# Patient Record
Sex: FEMALE | URBAN - METROPOLITAN AREA
[De-identification: names, ages, dates, MRNs, and addresses within clinical notes are randomized per-mention and may not be internally consistent; named-entity substitution may affect disease eponyms.]

---

## 2017-02-24 ENCOUNTER — EMERGENCY (EMERGENCY)
Facility: HOSPITAL | Age: 32
LOS: 1 days | Discharge: HOME | End: 2017-02-24
Attending: EMERGENCY MEDICINE | Admitting: EMERGENCY MEDICINE
Payer: SELF-PAY

## 2017-02-24 VITALS — HEIGHT: 57 IN | WEIGHT: 158.07 LBS

## 2017-02-24 VITALS
OXYGEN SATURATION: 99 % | HEART RATE: 72 BPM | TEMPERATURE: 99 F | DIASTOLIC BLOOD PRESSURE: 93 MMHG | RESPIRATION RATE: 20 BRPM | SYSTOLIC BLOOD PRESSURE: 152 MMHG

## 2017-02-24 DIAGNOSIS — G43.109 MIGRAINE WITH AURA, NOT INTRACTABLE, WITHOUT STATUS MIGRAINOSUS: ICD-10-CM

## 2017-02-24 DIAGNOSIS — R51 HEADACHE: ICD-10-CM

## 2017-02-24 LAB
ALBUMIN SERPL ELPH-MCNC: 4.3 G/DL — SIGNIFICANT CHANGE UP (ref 3.3–5.2)
ALP SERPL-CCNC: 76 U/L — SIGNIFICANT CHANGE UP (ref 40–120)
ALT FLD-CCNC: 27 U/L — SIGNIFICANT CHANGE UP
ANION GAP SERPL CALC-SCNC: 11 MMOL/L — SIGNIFICANT CHANGE UP (ref 5–17)
APPEARANCE UR: CLEAR — SIGNIFICANT CHANGE UP
AST SERPL-CCNC: 22 U/L — SIGNIFICANT CHANGE UP
BASOPHILS # BLD AUTO: 0 K/UL — SIGNIFICANT CHANGE UP (ref 0–0.2)
BASOPHILS NFR BLD AUTO: 0.4 % — SIGNIFICANT CHANGE UP (ref 0–2)
BILIRUB SERPL-MCNC: 0.2 MG/DL — LOW (ref 0.4–2)
BILIRUB UR-MCNC: NEGATIVE — SIGNIFICANT CHANGE UP
BUN SERPL-MCNC: 7 MG/DL — LOW (ref 8–20)
CALCIUM SERPL-MCNC: 9.5 MG/DL — SIGNIFICANT CHANGE UP (ref 8.6–10.2)
CHLORIDE SERPL-SCNC: 101 MMOL/L — SIGNIFICANT CHANGE UP (ref 98–107)
CO2 SERPL-SCNC: 27 MMOL/L — SIGNIFICANT CHANGE UP (ref 22–29)
COLOR SPEC: YELLOW — SIGNIFICANT CHANGE UP
CREAT SERPL-MCNC: 0.48 MG/DL — LOW (ref 0.5–1.3)
DIFF PNL FLD: ABNORMAL
EOSINOPHIL # BLD AUTO: 0.2 K/UL — SIGNIFICANT CHANGE UP (ref 0–0.5)
EOSINOPHIL NFR BLD AUTO: 1.8 % — SIGNIFICANT CHANGE UP (ref 0–6)
EPI CELLS # UR: SIGNIFICANT CHANGE UP
GLUCOSE SERPL-MCNC: 99 MG/DL — SIGNIFICANT CHANGE UP (ref 70–115)
GLUCOSE UR QL: NEGATIVE MG/DL — SIGNIFICANT CHANGE UP
HCG UR QL: NEGATIVE — SIGNIFICANT CHANGE UP
HCT VFR BLD CALC: 37.5 % — SIGNIFICANT CHANGE UP (ref 37–47)
HGB BLD-MCNC: 13.1 G/DL — SIGNIFICANT CHANGE UP (ref 12–16)
KETONES UR-MCNC: NEGATIVE — SIGNIFICANT CHANGE UP
LEUKOCYTE ESTERASE UR-ACNC: ABNORMAL
LYMPHOCYTES # BLD AUTO: 3.5 K/UL — SIGNIFICANT CHANGE UP (ref 1–4.8)
LYMPHOCYTES # BLD AUTO: 38.4 % — SIGNIFICANT CHANGE UP (ref 20–55)
MCHC RBC-ENTMCNC: 30.2 PG — SIGNIFICANT CHANGE UP (ref 27–31)
MCHC RBC-ENTMCNC: 34.9 G/DL — SIGNIFICANT CHANGE UP (ref 32–36)
MCV RBC AUTO: 86.4 FL — SIGNIFICANT CHANGE UP (ref 81–99)
MONOCYTES # BLD AUTO: 0.6 K/UL — SIGNIFICANT CHANGE UP (ref 0–0.8)
MONOCYTES NFR BLD AUTO: 6.5 % — SIGNIFICANT CHANGE UP (ref 3–10)
NEUTROPHILS # BLD AUTO: 4.8 K/UL — SIGNIFICANT CHANGE UP (ref 1.8–8)
NEUTROPHILS NFR BLD AUTO: 52.7 % — SIGNIFICANT CHANGE UP (ref 37–73)
NITRITE UR-MCNC: NEGATIVE — SIGNIFICANT CHANGE UP
PH UR: 7 — SIGNIFICANT CHANGE UP (ref 4.8–8)
PLATELET # BLD AUTO: 276 K/UL — SIGNIFICANT CHANGE UP (ref 150–400)
POTASSIUM SERPL-MCNC: 3.6 MMOL/L — SIGNIFICANT CHANGE UP (ref 3.5–5.3)
POTASSIUM SERPL-SCNC: 3.6 MMOL/L — SIGNIFICANT CHANGE UP (ref 3.5–5.3)
PROT SERPL-MCNC: 8 G/DL — SIGNIFICANT CHANGE UP (ref 6.6–8.7)
PROT UR-MCNC: NEGATIVE MG/DL — SIGNIFICANT CHANGE UP
RBC # BLD: 4.34 M/UL — LOW (ref 4.4–5.2)
RBC # FLD: 13 % — SIGNIFICANT CHANGE UP (ref 11–15.6)
RBC CASTS # UR COMP ASSIST: ABNORMAL /HPF (ref 0–4)
SODIUM SERPL-SCNC: 139 MMOL/L — SIGNIFICANT CHANGE UP (ref 135–145)
SP GR SPEC: 1.01 — SIGNIFICANT CHANGE UP (ref 1.01–1.02)
UROBILINOGEN FLD QL: 1 MG/DL
WBC # BLD: 9.1 K/UL — SIGNIFICANT CHANGE UP (ref 4.8–10.8)
WBC # FLD AUTO: 9.1 K/UL — SIGNIFICANT CHANGE UP (ref 4.8–10.8)
WBC UR QL: ABNORMAL

## 2017-02-24 PROCEDURE — 85027 COMPLETE CBC AUTOMATED: CPT

## 2017-02-24 PROCEDURE — T1013: CPT

## 2017-02-24 PROCEDURE — 81025 URINE PREGNANCY TEST: CPT

## 2017-02-24 PROCEDURE — 70450 CT HEAD/BRAIN W/O DYE: CPT | Mod: 26

## 2017-02-24 PROCEDURE — 99284 EMERGENCY DEPT VISIT MOD MDM: CPT | Mod: 25

## 2017-02-24 PROCEDURE — 70450 CT HEAD/BRAIN W/O DYE: CPT

## 2017-02-24 PROCEDURE — 99283 EMERGENCY DEPT VISIT LOW MDM: CPT

## 2017-02-24 PROCEDURE — 80053 COMPREHEN METABOLIC PANEL: CPT

## 2017-02-24 PROCEDURE — 96374 THER/PROPH/DIAG INJ IV PUSH: CPT

## 2017-02-24 PROCEDURE — 81001 URINALYSIS AUTO W/SCOPE: CPT

## 2017-02-24 RX ORDER — DIAZEPAM 5 MG
5 TABLET ORAL ONCE
Qty: 0 | Refills: 0 | Status: DISCONTINUED | OUTPATIENT
Start: 2017-02-24 | End: 2017-02-24

## 2017-02-24 RX ORDER — ONDANSETRON 8 MG/1
4 TABLET, FILM COATED ORAL ONCE
Qty: 0 | Refills: 0 | Status: COMPLETED | OUTPATIENT
Start: 2017-02-24 | End: 2017-02-24

## 2017-02-24 RX ADMIN — Medication 5 MILLIGRAM(S): at 21:16

## 2017-02-24 RX ADMIN — ONDANSETRON 4 MILLIGRAM(S): 8 TABLET, FILM COATED ORAL at 21:16

## 2017-02-24 NOTE — ED ADULT TRIAGE NOTE - CHIEF COMPLAINT QUOTE
c/o constant headache 3/10, started a wk ago states her vision gets worst went to [place] [facility] today and they said to come to ED maybe

## 2017-02-24 NOTE — ED ADULT NURSE NOTE - CHIEF COMPLAINT QUOTE
c/o constant headache 3/10, started a wk ago states her vision gets worst went to [place] [facility] today and they said to come to ED maybe

## 2017-02-24 NOTE — ED STATDOCS - EYES, MLM
Visual acuity: Right eye 20/200, left eye 20/200. Limited fundoscopic examination, retinal vessels normal appearing, limited visualization due to pt not being dilated. No hematoma, optic disc looks normal.

## 2017-02-24 NOTE — ED STATDOCS - OBJECTIVE STATEMENT
32 y/o female presents to ED c/o frontal HA x 2 weeks, more constant over the past week. Associated nausea. Denies vomiting. Pt reports +light sensitivity and sound sensitivity, and reports no relief with Advil. She also notes blurred vision x 6 days, worsened today. Denies eye pain or complete visual loss. Pt was seen at [facility] clinic earlier today, who stated that her symptoms were likely due to a migraine, though they advised her to seek ED evaluation for her vision changes. No medical evaluation prior to today. Pt does not wear glasses at baseline. Pt reports past h/o similar visual changes during pregnancy in 2014, though pt did not follow up as instructed. No further complaints at this time.  Aniyah Sandoval utilized to obtain history.

## 2017-02-24 NOTE — ED STATDOCS - ATTENDING CONTRIBUTION TO CARE
I, Eleazar Arguelles, performed the initial face to face bedside interview with this patient regarding history of present illness, review of symptoms and relevant past medical, social and family history.  I completed an independent physical examination.  I was the initial provider who evaluated this patient. I have signed out the follow up of any pending tests (i.e. labs, radiological studies) to the ACP.  I have communicated the patient’s plan of care and disposition with the ACP.  The history, relevant review of systems, past medical and surgical history, medical decision making, and physical examination was documented by the scribe in my presence and I attest to the accuracy of the documentation.

## 2017-02-24 NOTE — ED STATDOCS - NS ED MD SCRIBE ATTENDING SCRIBE SECTIONS
VITAL SIGNS( Pullset)/PAST MEDICAL/SURGICAL/SOCIAL HISTORY/HISTORY OF PRESENT ILLNESS/PHYSICAL EXAM/HIV/REVIEW OF SYSTEMS/DISPOSITION

## 2017-12-20 ENCOUNTER — EMERGENCY (EMERGENCY)
Facility: HOSPITAL | Age: 32
LOS: 1 days | Discharge: HOME | End: 2017-12-20
Attending: EMERGENCY MEDICINE
Payer: MEDICAID

## 2017-12-20 VITALS
HEART RATE: 78 BPM | TEMPERATURE: 98 F | SYSTOLIC BLOOD PRESSURE: 131 MMHG | DIASTOLIC BLOOD PRESSURE: 82 MMHG | RESPIRATION RATE: 18 BRPM | OXYGEN SATURATION: 97 % | WEIGHT: 156.09 LBS

## 2017-12-20 LAB
ALBUMIN SERPL ELPH-MCNC: 4.2 G/DL — SIGNIFICANT CHANGE UP (ref 3.3–5.2)
ALP SERPL-CCNC: 52 U/L — SIGNIFICANT CHANGE UP (ref 40–120)
ALT FLD-CCNC: 21 U/L — SIGNIFICANT CHANGE UP
ANION GAP SERPL CALC-SCNC: 14 MMOL/L — SIGNIFICANT CHANGE UP (ref 5–17)
AST SERPL-CCNC: 22 U/L — SIGNIFICANT CHANGE UP
BASOPHILS # BLD AUTO: 0 K/UL — SIGNIFICANT CHANGE UP (ref 0–0.2)
BASOPHILS NFR BLD AUTO: 0.6 % — SIGNIFICANT CHANGE UP (ref 0–2)
BILIRUB SERPL-MCNC: 0.2 MG/DL — LOW (ref 0.4–2)
BUN SERPL-MCNC: 7 MG/DL — LOW (ref 8–20)
CALCIUM SERPL-MCNC: 9.7 MG/DL — SIGNIFICANT CHANGE UP (ref 8.6–10.2)
CHLORIDE SERPL-SCNC: 100 MMOL/L — SIGNIFICANT CHANGE UP (ref 98–107)
CO2 SERPL-SCNC: 24 MMOL/L — SIGNIFICANT CHANGE UP (ref 22–29)
CREAT SERPL-MCNC: 0.48 MG/DL — LOW (ref 0.5–1.3)
EOSINOPHIL # BLD AUTO: 0.3 K/UL — SIGNIFICANT CHANGE UP (ref 0–0.5)
EOSINOPHIL NFR BLD AUTO: 3.6 % — SIGNIFICANT CHANGE UP (ref 0–6)
GLUCOSE SERPL-MCNC: 99 MG/DL — SIGNIFICANT CHANGE UP (ref 70–115)
HCT VFR BLD CALC: 38.8 % — SIGNIFICANT CHANGE UP (ref 37–47)
HGB BLD-MCNC: 13.3 G/DL — SIGNIFICANT CHANGE UP (ref 12–16)
LYMPHOCYTES # BLD AUTO: 3.1 K/UL — SIGNIFICANT CHANGE UP (ref 1–4.8)
LYMPHOCYTES # BLD AUTO: 37.2 % — SIGNIFICANT CHANGE UP (ref 20–55)
MCHC RBC-ENTMCNC: 30 PG — SIGNIFICANT CHANGE UP (ref 27–31)
MCHC RBC-ENTMCNC: 34.3 G/DL — SIGNIFICANT CHANGE UP (ref 32–36)
MCV RBC AUTO: 87.4 FL — SIGNIFICANT CHANGE UP (ref 81–99)
MONOCYTES # BLD AUTO: 0.7 K/UL — SIGNIFICANT CHANGE UP (ref 0–0.8)
MONOCYTES NFR BLD AUTO: 8.4 % — SIGNIFICANT CHANGE UP (ref 3–10)
NEUTROPHILS # BLD AUTO: 4.2 K/UL — SIGNIFICANT CHANGE UP (ref 1.8–8)
NEUTROPHILS NFR BLD AUTO: 50 % — SIGNIFICANT CHANGE UP (ref 37–73)
PLATELET # BLD AUTO: 292 K/UL — SIGNIFICANT CHANGE UP (ref 150–400)
POTASSIUM SERPL-MCNC: 4 MMOL/L — SIGNIFICANT CHANGE UP (ref 3.5–5.3)
POTASSIUM SERPL-SCNC: 4 MMOL/L — SIGNIFICANT CHANGE UP (ref 3.5–5.3)
PROT SERPL-MCNC: 8.1 G/DL — SIGNIFICANT CHANGE UP (ref 6.6–8.7)
RBC # BLD: 4.44 M/UL — SIGNIFICANT CHANGE UP (ref 4.4–5.2)
RBC # FLD: 13.1 % — SIGNIFICANT CHANGE UP (ref 11–15.6)
SODIUM SERPL-SCNC: 138 MMOL/L — SIGNIFICANT CHANGE UP (ref 135–145)
TROPONIN T SERPL-MCNC: <0.01 NG/ML — SIGNIFICANT CHANGE UP (ref 0–0.06)
WBC # BLD: 8.3 K/UL — SIGNIFICANT CHANGE UP (ref 4.8–10.8)
WBC # FLD AUTO: 8.3 K/UL — SIGNIFICANT CHANGE UP (ref 4.8–10.8)

## 2017-12-20 PROCEDURE — 84484 ASSAY OF TROPONIN QUANT: CPT

## 2017-12-20 PROCEDURE — 36415 COLL VENOUS BLD VENIPUNCTURE: CPT

## 2017-12-20 PROCEDURE — 71045 X-RAY EXAM CHEST 1 VIEW: CPT

## 2017-12-20 PROCEDURE — 99284 EMERGENCY DEPT VISIT MOD MDM: CPT | Mod: 25

## 2017-12-20 PROCEDURE — T1013: CPT

## 2017-12-20 PROCEDURE — 93005 ELECTROCARDIOGRAM TRACING: CPT

## 2017-12-20 PROCEDURE — 85027 COMPLETE CBC AUTOMATED: CPT

## 2017-12-20 PROCEDURE — 80053 COMPREHEN METABOLIC PANEL: CPT

## 2017-12-20 PROCEDURE — 99284 EMERGENCY DEPT VISIT MOD MDM: CPT

## 2017-12-20 PROCEDURE — 96374 THER/PROPH/DIAG INJ IV PUSH: CPT

## 2017-12-20 PROCEDURE — 84443 ASSAY THYROID STIM HORMONE: CPT

## 2017-12-20 PROCEDURE — 71010: CPT | Mod: 26

## 2017-12-20 PROCEDURE — 93010 ELECTROCARDIOGRAM REPORT: CPT

## 2017-12-20 RX ORDER — ONDANSETRON 8 MG/1
1 TABLET, FILM COATED ORAL
Qty: 15 | Refills: 0 | OUTPATIENT
Start: 2017-12-20 | End: 2017-12-24

## 2017-12-20 RX ORDER — ONDANSETRON 8 MG/1
4 TABLET, FILM COATED ORAL ONCE
Qty: 0 | Refills: 0 | Status: COMPLETED | OUTPATIENT
Start: 2017-12-20 | End: 2017-12-20

## 2017-12-20 RX ORDER — MECLIZINE HCL 12.5 MG
1 TABLET ORAL
Qty: 7 | Refills: 0 | OUTPATIENT
Start: 2017-12-20 | End: 2017-12-26

## 2017-12-20 RX ORDER — ACETAMINOPHEN 500 MG
650 TABLET ORAL ONCE
Qty: 0 | Refills: 0 | Status: COMPLETED | OUTPATIENT
Start: 2017-12-20 | End: 2017-12-20

## 2017-12-20 RX ORDER — SODIUM CHLORIDE 9 MG/ML
1000 INJECTION INTRAMUSCULAR; INTRAVENOUS; SUBCUTANEOUS ONCE
Qty: 0 | Refills: 0 | Status: COMPLETED | OUTPATIENT
Start: 2017-12-20 | End: 2017-12-20

## 2017-12-20 RX ADMIN — Medication 650 MILLIGRAM(S): at 15:09

## 2017-12-20 RX ADMIN — ONDANSETRON 4 MILLIGRAM(S): 8 TABLET, FILM COATED ORAL at 15:10

## 2017-12-20 RX ADMIN — SODIUM CHLORIDE 2000 MILLILITER(S): 9 INJECTION INTRAMUSCULAR; INTRAVENOUS; SUBCUTANEOUS at 15:09

## 2017-12-20 NOTE — ED ADULT NURSE NOTE - OBJECTIVE STATEMENT
with  dizziness, headache about one month. body weakness. worse today and yesterday.  gets worse when I eat. "clinic told me it could be vertigo"

## 2017-12-20 NOTE — ED STATDOCS - OBJECTIVE STATEMENT
33 y/o F pt with a hx of HTN presents to ED c/o constant HA with dizziness/ weakness, nausea, dark clouds in her vision and blurry vison x 1 month that worsens when she eats and movement. She rates her pain a 6/10. Pt states she had a CT done here for a HA and was given a medication ( does not recall the name). She states that when she came here they found nothing and her HA's might be due to stress. She states she recently went to the clinic and was told she might have vertigo. Pt takes Advil with no relief. The pt adds that she is also having difficulty breathing with CP when she takes a deep breathe.  Denies cough, light sensitivity, V/D, fever, chills, SOB, and abdominal pain. No further complaints at this time.   : Chandni

## 2017-12-20 NOTE — ED STATDOCS - ATTENDING CONTRIBUTION TO CARE
I, Dr. Arguelles, performed the initial face to face bedside interview with this patient regarding history of present illness, review of symptoms and relevant past medical, social and family history.  I completed an independent physical examination.  I was the initial provider who evaluated this patient. I have signed out the follow up of any pending tests (i.e. labs, radiological studies) to the ACP.  I have communicated the patient’s plan of care and disposition with the ACP.

## 2017-12-20 NOTE — ED ADULT TRIAGE NOTE - CHIEF COMPLAINT QUOTE
pt c/o dizziness, HA, body aches, difficulty breathing, nausea. symptoms for 2 weeks, worse past 2 days.

## 2017-12-20 NOTE — ED STATDOCS - PROGRESS NOTE DETAILS
PA NOTE: Pt seen by intake physician and hpi/orders/plan reviewed.   PLAN: will follow up plan per intake physician. D/c with antivert, zofran, and neuro f/u.

## 2018-06-22 ENCOUNTER — EMERGENCY (EMERGENCY)
Facility: HOSPITAL | Age: 33
LOS: 1 days | Discharge: HOME | End: 2018-06-22
Attending: EMERGENCY MEDICINE
Payer: SELF-PAY

## 2018-06-22 VITALS
OXYGEN SATURATION: 96 % | HEART RATE: 98 BPM | WEIGHT: 160.06 LBS | TEMPERATURE: 98 F | HEIGHT: 62 IN | RESPIRATION RATE: 116 BRPM | SYSTOLIC BLOOD PRESSURE: 114 MMHG | DIASTOLIC BLOOD PRESSURE: 79 MMHG

## 2018-06-22 PROCEDURE — 99053 MED SERV 10PM-8AM 24 HR FAC: CPT

## 2018-06-22 PROCEDURE — 99284 EMERGENCY DEPT VISIT MOD MDM: CPT | Mod: 25
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
## 2018-06-23 VITALS
SYSTOLIC BLOOD PRESSURE: 137 MMHG | RESPIRATION RATE: 16 BRPM | OXYGEN SATURATION: 97 % | DIASTOLIC BLOOD PRESSURE: 91 MMHG | TEMPERATURE: 99 F | HEART RATE: 92 BPM

## 2018-06-23 PROCEDURE — 70450 CT HEAD/BRAIN W/O DYE: CPT

## 2018-06-23 PROCEDURE — 72125 CT NECK SPINE W/O DYE: CPT | Mod: 26

## 2018-06-23 PROCEDURE — T1013: CPT

## 2018-06-23 PROCEDURE — 99285 EMERGENCY DEPT VISIT HI MDM: CPT

## 2018-06-23 PROCEDURE — 72125 CT NECK SPINE W/O DYE: CPT

## 2018-06-23 PROCEDURE — 70450 CT HEAD/BRAIN W/O DYE: CPT | Mod: 26

## 2018-06-23 RX ORDER — BACITRACIN ZINC 500 UNIT/G
1 OINTMENT IN PACKET (EA) TOPICAL ONCE
Qty: 0 | Refills: 0 | Status: COMPLETED | OUTPATIENT
Start: 2018-06-23 | End: 2018-06-23

## 2018-06-23 RX ADMIN — Medication 1 APPLICATION(S): at 01:11

## 2018-06-23 NOTE — ED PROVIDER NOTE - CARE PLAN
Principal Discharge DX:	Acute alcohol intoxication Principal Discharge DX:	Acute alcohol intoxication  Secondary Diagnosis:	Abrasion  Secondary Diagnosis:	Closed head injury

## 2018-06-23 NOTE — ED PROVIDER NOTE - PHYSICAL EXAMINATION
sitting comfortably, talking in full sentences  pupilsa reactive, eomi,   mm moist, no oral injury, no facial pain  supple, no c/t/l spine tenderness, from, trachea in midline  Celso chest expansion, no cheat wall tenderness, no rib tenderness, no deformity, no clavicular pain  S1 S2 distant  abd soft, non tender, no g/r,   no pelvic pain  moves all ext, no swelling noted  Neuro aao3, cn intact grossly, no focal deficits  skin: abrasion on rt forehead no hematoma
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
## 2018-10-09 ENCOUNTER — EMERGENCY (EMERGENCY)
Facility: HOSPITAL | Age: 33
LOS: 1 days | Discharge: HOME | End: 2018-10-09
Attending: EMERGENCY MEDICINE
Payer: SELF-PAY

## 2018-10-09 VITALS
OXYGEN SATURATION: 98 % | TEMPERATURE: 99 F | WEIGHT: 179.9 LBS | SYSTOLIC BLOOD PRESSURE: 123 MMHG | DIASTOLIC BLOOD PRESSURE: 80 MMHG | RESPIRATION RATE: 16 BRPM | HEART RATE: 82 BPM | HEIGHT: 63 IN

## 2018-10-09 PROBLEM — G43.909 MIGRAINE, UNSPECIFIED, NOT INTRACTABLE, WITHOUT STATUS MIGRAINOSUS: Chronic | Status: ACTIVE | Noted: 2017-12-20

## 2018-10-09 PROCEDURE — 99283 EMERGENCY DEPT VISIT LOW MDM: CPT

## 2018-10-09 PROCEDURE — T1013: CPT

## 2018-10-09 PROCEDURE — 99283 EMERGENCY DEPT VISIT LOW MDM: CPT | Mod: 25

## 2018-10-09 PROCEDURE — 99053 MED SERV 10PM-8AM 24 HR FAC: CPT
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
## 2018-10-09 NOTE — ED PROVIDER NOTE - MEDICAL DECISION MAKING DETAILS
clinvally sobe rno signs trauma return to ed for intractable HA, persistent vomiting, or new onset motor/sensory deficits
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
## 2018-10-18 ENCOUNTER — EMERGENCY (EMERGENCY)
Facility: HOSPITAL | Age: 33
LOS: 1 days | Discharge: HOME | End: 2018-10-18
Attending: EMERGENCY MEDICINE
Payer: MEDICAID

## 2018-10-18 VITALS
DIASTOLIC BLOOD PRESSURE: 81 MMHG | TEMPERATURE: 98 F | HEART RATE: 70 BPM | OXYGEN SATURATION: 99 % | SYSTOLIC BLOOD PRESSURE: 122 MMHG | RESPIRATION RATE: 18 BRPM

## 2018-10-18 VITALS — HEIGHT: 60 IN | WEIGHT: 166.01 LBS

## 2018-10-18 PROCEDURE — 93005 ELECTROCARDIOGRAM TRACING: CPT

## 2018-10-18 PROCEDURE — 71046 X-RAY EXAM CHEST 2 VIEWS: CPT

## 2018-10-18 PROCEDURE — 99283 EMERGENCY DEPT VISIT LOW MDM: CPT

## 2018-10-18 PROCEDURE — 71046 X-RAY EXAM CHEST 2 VIEWS: CPT | Mod: 26

## 2018-10-18 PROCEDURE — T1013: CPT

## 2018-10-18 PROCEDURE — 93010 ELECTROCARDIOGRAM REPORT: CPT

## 2018-10-18 RX ORDER — AZITHROMYCIN 500 MG/1
1 TABLET, FILM COATED ORAL
Qty: 1 | Refills: 0 | OUTPATIENT
Start: 2018-10-18 | End: 2018-10-22

## 2018-10-18 RX ORDER — IBUPROFEN 200 MG
600 TABLET ORAL ONCE
Qty: 0 | Refills: 0 | Status: COMPLETED | OUTPATIENT
Start: 2018-10-18 | End: 2018-10-18

## 2018-10-18 RX ADMIN — Medication 600 MILLIGRAM(S): at 11:31

## 2018-10-18 NOTE — ED STATDOCS - ATTENDING CONTRIBUTION TO CARE
I, Long Alexander, performed the initial face to face bedside interview with this patient regarding history of present illness, review of symptoms and relevant past medical, social and family history.  I completed an independent physical examination.  I was the initial provider who evaluated this patient. I have signed out the follow up of any pending tests (i.e. labs, radiological studies) to the ACP.  I have communicated the patient’s plan of care and disposition with the ACP.  The history, relevant review of systems, past medical and surgical history, medical decision making, and physical examination was documented by the scribe in my presence and I attest to the accuracy of the documentation.

## 2018-10-18 NOTE — ED ADULT NURSE NOTE - NSIMPLEMENTINTERV_GEN_ALL_ED
Implemented All Universal Safety Interventions:  [place] to call system. Call bell, personal items and telephone within reach. Instruct patient to call for assistance. Room bathroom lighting operational. Non-slip footwear when patient is off stretcher. Physically safe environment: no spills, clutter or unnecessary equipment. Stretcher in lowest position, wheels locked, appropriate side rails in place.
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
## 2018-10-18 NOTE — ED ADULT NURSE NOTE - OBJECTIVE STATEMENT
33 yof presents to ed with 2 young children all c/o cough denies fever denies chills denies burning urination lungs clear equal bilaterally abd soft nontender nondistended moves all extremities nonproductive cough afebrile at this time. 99% RA. no other complaints
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
## 2019-08-22 ENCOUNTER — EMERGENCY (EMERGENCY)
Facility: HOSPITAL | Age: 34
LOS: 1 days | Discharge: HOME | End: 2019-08-22
Attending: EMERGENCY MEDICINE
Payer: MEDICAID

## 2019-08-22 VITALS
HEIGHT: 62 IN | WEIGHT: 184.97 LBS | HEART RATE: 71 BPM | RESPIRATION RATE: 20 BRPM | SYSTOLIC BLOOD PRESSURE: 126 MMHG | TEMPERATURE: 99 F | DIASTOLIC BLOOD PRESSURE: 80 MMHG | OXYGEN SATURATION: 98 %

## 2019-08-22 PROCEDURE — 99282 EMERGENCY DEPT VISIT SF MDM: CPT

## 2019-08-22 PROCEDURE — 99283 EMERGENCY DEPT VISIT LOW MDM: CPT

## 2019-08-22 RX ORDER — DIPHENHYDRAMINE HCL 50 MG
25 CAPSULE ORAL EVERY 4 HOURS
Refills: 0 | Status: DISCONTINUED | OUTPATIENT
Start: 2019-08-22 | End: 2019-08-29

## 2019-08-22 RX ADMIN — Medication 25 MILLIGRAM(S): at 22:34

## 2019-08-22 NOTE — ED PROVIDER NOTE - CLINICAL SUMMARY MEDICAL DECISION MAKING FREE TEXT BOX
33 with various bug bites. no secondary signs of infection.   dc with instructions for aveeno calamine lotion and use on benadryl

## 2019-08-22 NOTE — ED PROVIDER NOTE - OBJECTIVE STATEMENT
32 yo female c/o of total body itching. states that they were outside 8 days ago. continued itching, minimal relief with otc medications. no fever chills. no topical creams used. son with bug bites to lower legs. no one else in the house with rash.

## 2019-08-22 NOTE — ED ADULT NURSE NOTE - OBJECTIVE STATEMENT
pt aox4 states c/o itchiness to upper extremities "I thought they were mosquito bites." denies any other complaints at this time. no apparent distress noted. pt educated on plan of care, pt able to successfully teach back plan of care to RN, RN will continue to reeducate pt during hospital stay.

## 2019-08-22 NOTE — ED PROVIDER NOTE - ATTENDING CONTRIBUTION TO CARE
I, Juwan Fuentes, performed a face to face bedside interview with this patient regarding history of present illness, review of symptoms and relevant past medical, social and family history.  I completed an independent physical examination. I have communicated the patient’s plan of care and disposition with the ACP.      32 yo female c/o of total body itching. states that they were outside 8 days ago. continued itching, minimal relief with otc medications. no fever chills. no topical creams used. son with bug bites to lower legs. no one else in the house with rash.  pe awake alert in nad; heent ncat neck supple  chest clear abd soft skin  papules with excoriations;   dx rash

## 2019-08-22 NOTE — ED ADULT TRIAGE NOTE - DIRECT TO ROOM CARE INITIATED:
CHENG RADER    Patient Age: 49 year old   Refill request by: Pharmacy fax  Refill to be: ePrescribed to [facility]    Medication requested to be refilled:     lisinopril-hydroCHLOROthiazide (PRINZIDE,ZESTORETIC) 20-12.5 MG per tablet  Sig: Take 1 tablet by mouth daily.    TRADJENTA  Sig: Take 1 tablet by mouth daily.     Next and Last Visit with Provider and Department  Last visit with this provider: 3/27/2019  Last visit with this department: Visit date not found    Next visit with this provider: Visit date not found  Next visit with this department: Visit date not found    WEIGHT AND HEIGHT:   Wt Readings from Last 1 Encounters:   03/27/19 68 kg (150 lb)     Ht Readings from Last 1 Encounters:   11/20/18 5' 5\" (1.651 m)     BMI Readings from Last 1 Encounters:   03/27/19 24.96 kg/m²       ALLERGIES:  Penicillins  Current Outpatient Medications   Medication   • atorvastatin (LIPITOR) 10 MG tablet   • linaGLIPtin (TRADJENTA) 5 MG tablet   • lisinopril-hydroCHLOROthiazide (PRINZIDE,ZESTORETIC) 20-12.5 MG per tablet   • metformin (GLUCOPHAGE-XR) 750 MG 24 hr tablet   • hydroCORTisone (CORTIZONE) 2.5 % ointment     No current facility-administered medications for this visit.      PHARMACY to use: [facility]           Pharmacy preference(s) on file:   [id number] - [place], [place] - [address]  [address] [address]  Phone: [phone] Fax: [phone]    [id number] - [place], [place] - [address]  [address]  [facility] [address]  Phone: [phone] Fax: [phone]      CALL BACK INFO: Ok to leave response (including medical information) with family member or on answering machine  ROUTING: Patient's physician/staff        PCP: No Pcp         INS: Payor: BLUE [employer]OSS BLUE [place]ELD [place] / Plan: PPO [id number] / Product Type: PPO MISC   PATIENT ADDRESS:  [address] [place] [address]     22-Aug-2019 21:54

## 2020-01-20 ENCOUNTER — EMERGENCY (EMERGENCY)
Facility: HOSPITAL | Age: 35
LOS: 1 days | Discharge: HOME | End: 2020-01-20
Attending: EMERGENCY MEDICINE
Payer: MEDICAID

## 2020-01-20 VITALS
SYSTOLIC BLOOD PRESSURE: 110 MMHG | RESPIRATION RATE: 16 BRPM | OXYGEN SATURATION: 98 % | HEART RATE: 105 BPM | DIASTOLIC BLOOD PRESSURE: 61 MMHG

## 2020-01-20 PROCEDURE — 99284 EMERGENCY DEPT VISIT MOD MDM: CPT

## 2020-01-20 PROCEDURE — 96375 TX/PRO/DX INJ NEW DRUG ADDON: CPT

## 2020-01-20 PROCEDURE — 99285 EMERGENCY DEPT VISIT HI MDM: CPT | Mod: 25

## 2020-01-20 PROCEDURE — T1013: CPT

## 2020-01-20 PROCEDURE — 96374 THER/PROPH/DIAG INJ IV PUSH: CPT

## 2020-01-20 RX ORDER — HALOPERIDOL DECANOATE 100 MG/ML
5 INJECTION INTRAMUSCULAR ONCE
Refills: 0 | Status: COMPLETED | OUTPATIENT
Start: 2020-01-20 | End: 2020-01-20

## 2020-01-20 RX ORDER — KETAMINE HYDROCHLORIDE 100 MG/ML
50 INJECTION INTRAMUSCULAR; INTRAVENOUS ONCE
Refills: 0 | Status: DISCONTINUED | OUTPATIENT
Start: 2020-01-20 | End: 2020-01-20

## 2020-01-20 RX ADMIN — KETAMINE HYDROCHLORIDE 50 MILLIGRAM(S): 100 INJECTION INTRAMUSCULAR; INTRAVENOUS at 20:50

## 2020-01-20 RX ADMIN — Medication 4 MILLIGRAM(S): at 20:50

## 2020-01-20 RX ADMIN — HALOPERIDOL DECANOATE 5 MILLIGRAM(S): 100 INJECTION INTRAMUSCULAR at 20:50

## 2020-01-20 NOTE — ED PROVIDER NOTE - NSFOLLOWUPINSTRUCTIONS_ED_ALL_ED_FT
Alcohol Abuse    Alcohol intoxication occurs when the amount of alcohol that a person has consumed impairs his or her ability to mentally and physically function. Chronic alcohol consumption can also lead to a variety of health issues including neurological disease, stomach disease, heart disease, liver disease, etc. Do not drive after drinking alcohol. Drinking enough alcohol to end up in an Emergency Room suggests you may have an alcohol abuse problem. Seek help at a drug addiction center.    SEEK IMMEDIATE MEDICAL CARE IF YOU HAVE ANY OF THE FOLLOWING SYMPTOMS: seizures, vomiting blood, blood in your stool, lightheadedness/dizziness, or becoming shaky to tremulous when you stop drinking. abuse
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
## 2020-01-20 NOTE — ED PROVIDER NOTE - UNABLE TO OBTAIN
unable to obtain HPI due to intoxication Urgent need for Intervention unable to obtain ROS due to intoxication
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
## 2020-01-20 NOTE — ED PROVIDER NOTE - PATIENT PORTAL LINK FT
You can access the FollowMyHealth Patient Portal offered by [facility] by registering at the following website: [email or web address]. By joining [facility]’s FollowMyHealth portal, you will also be able to view your health information using other applications (apps) compatible with our system.

## 2020-01-21 VITALS
HEART RATE: 85 BPM | DIASTOLIC BLOOD PRESSURE: 62 MMHG | OXYGEN SATURATION: 95 % | SYSTOLIC BLOOD PRESSURE: 102 MMHG | RESPIRATION RATE: 18 BRPM

## 2020-03-24 ENCOUNTER — EMERGENCY (EMERGENCY)
Facility: HOSPITAL | Age: 35
LOS: 1 days | Discharge: HOME | End: 2020-03-24
Attending: EMERGENCY MEDICINE
Payer: MEDICAID

## 2020-03-24 VITALS
SYSTOLIC BLOOD PRESSURE: 110 MMHG | OXYGEN SATURATION: 96 % | TEMPERATURE: 99 F | HEART RATE: 96 BPM | HEIGHT: 60 IN | WEIGHT: 184.97 LBS | DIASTOLIC BLOOD PRESSURE: 76 MMHG | RESPIRATION RATE: 18 BRPM

## 2020-03-24 LAB — RAPID RVP RESULT: SIGNIFICANT CHANGE UP

## 2020-03-24 PROCEDURE — 87581 M.PNEUMON DNA AMP PROBE: CPT

## 2020-03-24 PROCEDURE — 99284 EMERGENCY DEPT VISIT MOD MDM: CPT

## 2020-03-24 PROCEDURE — 87633 RESP VIRUS 12-25 TARGETS: CPT

## 2020-03-24 PROCEDURE — 87798 DETECT AGENT NOS DNA AMP: CPT

## 2020-03-24 PROCEDURE — T1013: CPT

## 2020-03-24 PROCEDURE — 87486 CHLMYD PNEUM DNA AMP PROBE: CPT

## 2020-03-24 PROCEDURE — U0001: CPT

## 2020-03-24 RX ORDER — ALBUTEROL 90 UG/1
2 AEROSOL, METERED ORAL
Qty: 1 | Refills: 0
Start: 2020-03-24 | End: 2020-04-22

## 2020-03-24 RX ORDER — ACETAMINOPHEN 500 MG
2 TABLET ORAL
Qty: 84 | Refills: 0
Start: 2020-03-24 | End: 2020-03-30

## 2020-03-24 NOTE — ED STATDOCS - PATIENT PORTAL LINK FT
You can access the FollowMyHealth Patient Portal offered by [facility] by registering at the following website: [email or web address]. By joining [facility]’s FollowMyHealth portal, you will also be able to view your health information using other applications (apps) compatible with our system.

## 2020-03-24 NOTE — ED ADULT TRIAGE NOTE - AS HEIGHT TYPE
Mailed letter.Christin Quinn MA/VISHNU    
Prescription approved per [facility] Refill Protocol #30  APPT NEEDED FOR FURTHER REFILLS  Please help the pt schedule an appointment hypertension visit, physical.    Health Maintenance Due   Topic Date Due     HIV SCREENING  11/15/1990     DTAP/TDAP/TD IMMUNIZATION (2 - Td) 05/12/2018     PREVENTIVE CARE VISIT  05/07/2019     LUIS ANTONIO ASSESSMENT  06/27/2019     PHQ-9  06/27/2019     Sarah Braswell RN     
stated

## 2020-03-24 NOTE — ED ADULT TRIAGE NOTE - WEIGHT IN KG
83.9 I will SWITCH the dose or number of times a day I take the medications listed below when I get home from the hospital:  None

## 2020-03-24 NOTE — ED STATDOCS - NSFOLLOWUPINSTRUCTIONS_ED_ALL_ED_FT
BARBARA TODAS LAS INSTRUCCIONES ATAQUES PARA CORONAVIRUS INMEDIATAMENTE -Se le ha probado para DETECTAR COVID19 (Coronavirus) minor shane visita al Departamento de Emergencias. -Los resultados tomarán 5-7 días -NO regrese al trabajo /escuela/áreas públicas hasta que los resultados de shane prueba COVID luh negativos. -SELF QUARANTINE hasta que el resultado DE COVID esté disponible. -Evitar el contacto con otras personas. -Lávese las roslyn con frecuencia. Desinfectar superficies con frecuencia BUSCA CUIDADO MEDICO INMEDIATO SI TIENE CUALQUIERA DE LOS SIGUIENTES SISTEMAS **Si desarrolla empeoramiento o nuevos síntomas caty dificultad para respirar, dificultad para respirar, dolor en el pecho, confusión, debilidad grave o cualquier cosa relacionada con usted, busque atención médica inmediata o regrese a Urgencias .**

## 2020-03-24 NOTE — ED STATDOCS - CLINICAL SUMMARY MEDICAL DECISION MAKING FREE TEXT BOX
Patient cough congestion, fever. Will send COVID testing, recommend Tylenol every 4 hours and cough suppressant

## 2020-03-24 NOTE — ED STATDOCS - NS ED ROS FT
Review of Systems  •	CONSTITUTIONAL -  (+) fever, (+) Body aches, no diaphoresis, no weight change  •	SKIN - no rash  •	HEMATOLOGIC - no bleeding, no bruising  •	EYES - no eye pain, no blurred vision  •	ENT - (+) Nasal congestion, no change in hearing, no pain  •	RESPIRATORY - (+) cough, no shortness of breath,  •	CARDIAC - no chest pain, no palpitations  •	GI - no abd pain, no nausea, no vomiting, no diarrhea, no constipation, no bleeding  •	GENITO-URINARY - no discharge, no dysuria; no hematuria,   •	ENDO - no polydypsia, no polyurea, no heat/no cold intolerance  •	MUSCULOSKELETAL - no joint pain, no swelling, no redness  •	NEUROLOGIC - no weakness, no headache, no anesthesia, no paresthesias  •	PSYCH - no anxiety, non suicidal, non homicidal, no hallucination, no depression

## 2020-03-24 NOTE — ED STATDOCS - OBJECTIVE STATEMENT
35 y/o female with PMHx of Migraines presents to ED c/o flu-like symptoms. Patient sates she has cough, congestion, subjective fever, diffuse body aches for 3 days. Took Ibuprofen at 11am with no relief.     Denies recent sick contact  : Yasmeen

## 2020-03-24 NOTE — ED STATDOCS - PHYSICAL EXAMINATION
VITAL SIGNS: I have reviewed nursing notes and confirm.  CONSTITUTIONAL: (+) Comfortable looking, Well-developed; well-nourished; in no acute distress.  SKIN: Skin exam is warm and dry, no acute rash.  HEAD: Normocephalic; atraumatic.  EYES: PERRL, EOM intact; conjunctiva and sclera clear.  ENT: No nasal discharge; airway clear. Throat clear.  NECK: Supple; non tender.    CARD: S1, S2 normal; no murmurs, gallops, or rubs. Regular rate and rhythm.  RESP: (+) Dry cough. No wheezes,  no rales or rhonchi.   ABD:  soft; non-distended; non-tender;   EXT: Normal ROM. No clubbing, cyanosis or edema.  NEURO: Alert, oriented. Grossly unremarkable. No focal deficits. no facial droop, moves all extremities,  no pronator drift, finger-to-nose wnl, normal gait   PSYCH: Cooperative, appropriate. VITAL SIGNS: I have reviewed nursing notes and confirm.  CONSTITUTIONAL: (+) Comfortable looking, Well-developed; well-nourished; in no acute distress.  SKIN: Skin exam is warm and dry, no acute rash.  HEAD: Normocephalic; atraumatic.  EYES: PERRL, EOM intact; conjunctiva and sclera clear.  ENT: No nasal discharge; airway clear. Throat clear.  NECK: Supple; non tender.    CARD: S1, S2 normal; no murmurs, gallops, or rubs. Regular rate and rhythm.  RESP: (+) Dry cough. No wheezes,  no rales or rhonchi.   ABD:  soft; non-distended; non-tender;   EXT: Normal ROM. No clubbing, cyanosis or edema.  NEURO: Alert, oriented. Grossly unremarkable. No focal deficits. no facial droop, moves all extremities, normal gait   PSYCH: Cooperative, appropriate.

## 2020-03-26 LAB — SARS-COV-2 RNA SPEC QL NAA+PROBE: DETECTED

## 2020-08-01 ENCOUNTER — OUTPATIENT (OUTPATIENT)
Dept: OUTPATIENT SERVICES | Facility: HOSPITAL | Age: 35
LOS: 1 days | End: 2020-08-01
Payer: MEDICAID

## 2020-08-01 PROCEDURE — G9005: CPT

## 2020-08-15 ENCOUNTER — EMERGENCY (EMERGENCY)
Facility: HOSPITAL | Age: 35
LOS: 1 days | Discharge: HOME | End: 2020-08-15
Attending: EMERGENCY MEDICINE
Payer: MEDICAID

## 2020-08-15 VITALS
HEART RATE: 80 BPM | WEIGHT: 182.98 LBS | HEIGHT: 60 IN | TEMPERATURE: 98 F | OXYGEN SATURATION: 98 % | SYSTOLIC BLOOD PRESSURE: 108 MMHG | DIASTOLIC BLOOD PRESSURE: 72 MMHG | RESPIRATION RATE: 16 BRPM

## 2020-08-15 LAB
ALBUMIN SERPL ELPH-MCNC: 4.1 G/DL — SIGNIFICANT CHANGE UP (ref 3.3–5.2)
ALP SERPL-CCNC: 74 U/L — SIGNIFICANT CHANGE UP (ref 40–120)
ALT FLD-CCNC: 22 U/L — SIGNIFICANT CHANGE UP
ANION GAP SERPL CALC-SCNC: 17 MMOL/L — SIGNIFICANT CHANGE UP (ref 5–17)
APPEARANCE UR: CLEAR — SIGNIFICANT CHANGE UP
AST SERPL-CCNC: 28 U/L — SIGNIFICANT CHANGE UP
BACTERIA # UR AUTO: ABNORMAL
BASOPHILS # BLD AUTO: 0.04 K/UL — SIGNIFICANT CHANGE UP (ref 0–0.2)
BASOPHILS NFR BLD AUTO: 0.4 % — SIGNIFICANT CHANGE UP (ref 0–2)
BILIRUB SERPL-MCNC: <0.2 MG/DL — LOW (ref 0.4–2)
BILIRUB UR-MCNC: NEGATIVE — SIGNIFICANT CHANGE UP
BLD GP AB SCN SERPL QL: SIGNIFICANT CHANGE UP
BUN SERPL-MCNC: 5 MG/DL — LOW (ref 8–20)
CALCIUM SERPL-MCNC: 9.3 MG/DL — SIGNIFICANT CHANGE UP (ref 8.6–10.2)
CHLORIDE SERPL-SCNC: 99 MMOL/L — SIGNIFICANT CHANGE UP (ref 98–107)
CO2 SERPL-SCNC: 21 MMOL/L — LOW (ref 22–29)
COLOR SPEC: YELLOW — SIGNIFICANT CHANGE UP
CREAT SERPL-MCNC: 0.4 MG/DL — LOW (ref 0.5–1.3)
DIFF PNL FLD: NEGATIVE — SIGNIFICANT CHANGE UP
EOSINOPHIL # BLD AUTO: 0.16 K/UL — SIGNIFICANT CHANGE UP (ref 0–0.5)
EOSINOPHIL NFR BLD AUTO: 1.7 % — SIGNIFICANT CHANGE UP (ref 0–6)
EPI CELLS # UR: SIGNIFICANT CHANGE UP
GLUCOSE SERPL-MCNC: 102 MG/DL — HIGH (ref 70–99)
GLUCOSE UR QL: NEGATIVE MG/DL — SIGNIFICANT CHANGE UP
HCT VFR BLD CALC: 36.4 % — SIGNIFICANT CHANGE UP (ref 34.5–45)
HGB BLD-MCNC: 12.5 G/DL — SIGNIFICANT CHANGE UP (ref 11.5–15.5)
IMM GRANULOCYTES NFR BLD AUTO: 0.3 % — SIGNIFICANT CHANGE UP (ref 0–1.5)
KETONES UR-MCNC: NEGATIVE — SIGNIFICANT CHANGE UP
LEUKOCYTE ESTERASE UR-ACNC: ABNORMAL
LYMPHOCYTES # BLD AUTO: 2.62 K/UL — SIGNIFICANT CHANGE UP (ref 1–3.3)
LYMPHOCYTES # BLD AUTO: 27.8 % — SIGNIFICANT CHANGE UP (ref 13–44)
MCHC RBC-ENTMCNC: 30.9 PG — SIGNIFICANT CHANGE UP (ref 27–34)
MCHC RBC-ENTMCNC: 34.3 GM/DL — SIGNIFICANT CHANGE UP (ref 32–36)
MCV RBC AUTO: 90.1 FL — SIGNIFICANT CHANGE UP (ref 80–100)
MONOCYTES # BLD AUTO: 0.58 K/UL — SIGNIFICANT CHANGE UP (ref 0–0.9)
MONOCYTES NFR BLD AUTO: 6.2 % — SIGNIFICANT CHANGE UP (ref 2–14)
NEUTROPHILS # BLD AUTO: 5.99 K/UL — SIGNIFICANT CHANGE UP (ref 1.8–7.4)
NEUTROPHILS NFR BLD AUTO: 63.6 % — SIGNIFICANT CHANGE UP (ref 43–77)
NITRITE UR-MCNC: NEGATIVE — SIGNIFICANT CHANGE UP
PH UR: 7 — SIGNIFICANT CHANGE UP (ref 5–8)
PLATELET # BLD AUTO: 304 K/UL — SIGNIFICANT CHANGE UP (ref 150–400)
POTASSIUM SERPL-MCNC: 3.6 MMOL/L — SIGNIFICANT CHANGE UP (ref 3.5–5.3)
POTASSIUM SERPL-SCNC: 3.6 MMOL/L — SIGNIFICANT CHANGE UP (ref 3.5–5.3)
PROT SERPL-MCNC: 7.7 G/DL — SIGNIFICANT CHANGE UP (ref 6.6–8.7)
PROT UR-MCNC: 15 MG/DL
RBC # BLD: 4.04 M/UL — SIGNIFICANT CHANGE UP (ref 3.8–5.2)
RBC # FLD: 12.2 % — SIGNIFICANT CHANGE UP (ref 10.3–14.5)
RBC CASTS # UR COMP ASSIST: ABNORMAL /HPF (ref 0–4)
SODIUM SERPL-SCNC: 137 MMOL/L — SIGNIFICANT CHANGE UP (ref 135–145)
SP GR SPEC: 1.01 — SIGNIFICANT CHANGE UP (ref 1.01–1.02)
UROBILINOGEN FLD QL: NEGATIVE MG/DL — SIGNIFICANT CHANGE UP
WBC # BLD: 9.42 K/UL — SIGNIFICANT CHANGE UP (ref 3.8–10.5)
WBC # FLD AUTO: 9.42 K/UL — SIGNIFICANT CHANGE UP (ref 3.8–10.5)
WBC UR QL: SIGNIFICANT CHANGE UP

## 2020-08-15 PROCEDURE — 96360 HYDRATION IV INFUSION INIT: CPT

## 2020-08-15 PROCEDURE — 76801 OB US < 14 WKS SINGLE FETUS: CPT | Mod: 26

## 2020-08-15 PROCEDURE — 84702 CHORIONIC GONADOTROPIN TEST: CPT

## 2020-08-15 PROCEDURE — 85027 COMPLETE CBC AUTOMATED: CPT

## 2020-08-15 PROCEDURE — 87086 URINE CULTURE/COLONY COUNT: CPT

## 2020-08-15 PROCEDURE — 80053 COMPREHEN METABOLIC PANEL: CPT

## 2020-08-15 PROCEDURE — 81001 URINALYSIS AUTO W/SCOPE: CPT

## 2020-08-15 PROCEDURE — 76801 OB US < 14 WKS SINGLE FETUS: CPT

## 2020-08-15 PROCEDURE — 36415 COLL VENOUS BLD VENIPUNCTURE: CPT

## 2020-08-15 PROCEDURE — 99284 EMERGENCY DEPT VISIT MOD MDM: CPT | Mod: 25

## 2020-08-15 PROCEDURE — T1013: CPT

## 2020-08-15 PROCEDURE — 86850 RBC ANTIBODY SCREEN: CPT

## 2020-08-15 PROCEDURE — 76815 OB US LIMITED FETUS(S): CPT | Mod: 26

## 2020-08-15 PROCEDURE — 86901 BLOOD TYPING SEROLOGIC RH(D): CPT

## 2020-08-15 PROCEDURE — 86900 BLOOD TYPING SEROLOGIC ABO: CPT

## 2020-08-15 PROCEDURE — 96361 HYDRATE IV INFUSION ADD-ON: CPT

## 2020-08-15 PROCEDURE — 99285 EMERGENCY DEPT VISIT HI MDM: CPT

## 2020-08-15 RX ORDER — CEPHALEXIN 500 MG
1 CAPSULE ORAL
Qty: 14 | Refills: 0
Start: 2020-08-15 | End: 2020-08-21

## 2020-08-15 RX ORDER — CEPHALEXIN 500 MG
500 CAPSULE ORAL ONCE
Refills: 0 | Status: COMPLETED | OUTPATIENT
Start: 2020-08-15 | End: 2020-08-15

## 2020-08-15 RX ORDER — SODIUM CHLORIDE 9 MG/ML
1000 INJECTION INTRAMUSCULAR; INTRAVENOUS; SUBCUTANEOUS ONCE
Refills: 0 | Status: COMPLETED | OUTPATIENT
Start: 2020-08-15 | End: 2020-08-15

## 2020-08-15 RX ORDER — ACETAMINOPHEN 500 MG
650 TABLET ORAL ONCE
Refills: 0 | Status: COMPLETED | OUTPATIENT
Start: 2020-08-15 | End: 2020-08-15

## 2020-08-15 RX ADMIN — SODIUM CHLORIDE 1000 MILLILITER(S): 9 INJECTION INTRAMUSCULAR; INTRAVENOUS; SUBCUTANEOUS at 17:51

## 2020-08-15 RX ADMIN — Medication 650 MILLIGRAM(S): at 17:51

## 2020-08-15 RX ADMIN — Medication 500 MILLIGRAM(S): at 20:55

## 2020-08-15 RX ADMIN — SODIUM CHLORIDE 1000 MILLILITER(S): 9 INJECTION INTRAMUSCULAR; INTRAVENOUS; SUBCUTANEOUS at 20:51

## 2020-08-15 NOTE — ED ADULT NURSE REASSESSMENT NOTE - NS ED NURSE REASSESS COMMENT FT1
Assumed care from previous RN. Plan of care reviewed. Chief complaint of abdominal pain. Pt states she is three months pregnant. Denies vomiting, nausea. Pt states she is has pressure when urinating. Denies vaginal bleeding. Pt alert and oriented x3, respirations even and unlabored, skin warm and dry, color appropriate for ethnicity, speech clear, moving extremities. Updated pt on plan of care. Will continue to monitor.

## 2020-08-15 NOTE — ED STATDOCS - CLINICAL SUMMARY MEDICAL DECISION MAKING FREE TEXT BOX
Patient approx. 3 months pregnant, presents with suprapubic tenderness and constant burning sensation. Will get blood work, urine, Tylenol for pain, IV fluids for hydration, and OB ultrasound.

## 2020-08-15 NOTE — ED STATDOCS - NSFOLLOWUPINSTRUCTIONS_ED_ALL_ED_FT
Urinary Tract Infection    A urinary tract infection (UTI) is an infection of any part of the urinary tract, which includes the kidneys, ureters, bladder, and urethra. Risk factors include ignoring your need to urinate, wiping back to front if female, being an uncircumcised male, and having diabetes or a weak immune system. Symptoms include frequent urination, pain or burning with urination, foul smelling urine, cloudy urine, pain in the lower abdomen, blood in the urine, and fever.  you were prescribed an antibiotic medicine, take it as told by your health care provider. Do not stop taking the antibiotic even if you start to feel better.    SEEK IMMEDIATE MEDICAL CARE IF YOU HAVE ANY OF THE FOLLOWING SYMPTOMS: severe back or abdominal pain, fever, inability to keep fluids or medicine down, dizziness/lightheadedness, or a change in mental status.   Follow up with [facility] clinic for pregnancy care. Urinary Tract Infection    A urinary tract infection (UTI) is an infection of any part of the urinary tract, which includes the kidneys, ureters, bladder, and urethra. Risk factors include ignoring your need to urinate, wiping back to front if female, being an uncircumcised male, and having diabetes or a weak immune system. Symptoms include frequent urination, pain or burning with urination, foul smelling urine, cloudy urine, pain in the lower abdomen, blood in the urine, and fever.  you were prescribed an antibiotic medicine, take it as told by your health care provider. Do not stop taking the antibiotic even if you start to feel better.    SEEK IMMEDIATE MEDICAL CARE IF YOU HAVE ANY OF THE FOLLOWING SYMPTOMS: severe back or abdominal pain, fever, inability to keep fluids or medicine down, dizziness/lightheadedness, or a change in mental status.      Follow up with [facility] clinic for pregnancy care.

## 2020-08-15 NOTE — ED ADULT TRIAGE NOTE - CHIEF COMPLAINT QUOTE
Pt A&Ox4 states "I have lower belly pain and I am 3 months pregnant." Patient having nausea and cramping, no spotting or bleeding. Started a week ago but worse today.

## 2020-08-15 NOTE — ED STATDOCS - OBJECTIVE STATEMENT
33 y/o female with PMHx of Migraines presents to ED c/o abdominal pain. Patient is about 3 months pregnant, [id number] reports abdominal pain, with a burning sensation, also reports vaginal pain and burning with urination. Has not had an ultrasound. Reports symptoms for the past 2 weeks, but worsened today.     Denies vaginal bleeding or discharge, N/V/D, fevers, chills, cough  OBGYN: [facility]

## 2020-08-15 NOTE — ED STATDOCS - PHYSICAL EXAMINATION
VITAL SIGNS: I have reviewed nursing notes and confirm.  CONSTITUTIONAL: Well-developed; well-nourished; in no acute distress.  SKIN: Skin exam is warm and dry, no acute rash.  HEAD: Normocephalic; atraumatic.  EYES: PERRL, EOM intact; conjunctiva and sclera clear.  ENT: No nasal discharge; airway clear. Throat clear.  NECK: Supple; non tender.    CARD: S1, S2 normal; Regular rate and rhythm.  RESP: No wheezes,  no rales or rhonchi.   ABD:  (+)Suprapubic tenderness, soft; non-distended;  EXT: Normal ROM. No clubbing, cyanosis or edema.  NEURO: Alert, oriented. Grossly unremarkable. No focal deficits. no facial droop, moves all extremities,  normal gait   PSYCH: Cooperative, appropriate.

## 2020-08-15 NOTE — ED STATDOCS - ATTENDING CONTRIBUTION TO CARE
I, Ulises Kim, performed the initial face to face bedside interview with this patient regarding history of present illness, review of symptoms and relevant past medical, social and family history.  I completed an independent physical examination.  I was the initial provider who evaluated this patient. I have signed out the follow up of any pending tests (i.e. labs, radiological studies) to the ACP.  I have communicated the patient’s plan of care and disposition with the ACP.

## 2020-08-15 NOTE — ED ADULT NURSE NOTE - OBJECTIVE STATEMENT
Pt  is currently 3 months pregnant c/o lower abdominal cramping x's 1 week. Denies any vaginal bleeding or discharge.

## 2020-08-15 NOTE — ED STATDOCS - PATIENT PORTAL LINK FT
You can access the FollowMyHealth Patient Portal offered by [facility] by registering at the following website: [email or web address]. By joining [employer]’s FollowMyHealth portal, you will also be able to view your health information using other applications (apps) compatible with our system.

## 2020-08-15 NOTE — ED STATDOCS - NS ED ROS FT
Constitutional: No fever.  Eyes: No vision changes.    Ears, Nose, Mouth, Throat: No sore throat.  Cardiovascular: No chest pain.  Respiratory: No difficulty breathing.  Gastrointestinal: (+) Burning sensation in abdomen, (+) abdominal pain, No nausea or vomiting.  Genitourinary: (+) Burning with urination, No dysuria.  Musculoskeletal: No joint pain.  Integumentary (skin and/or breast): No rash.  Neurological: No headache.  Psychiatric: No depression.  Endocrine:   No heat / cold intolerance.  Hematologic/Lymphatic: No easy bruising    Allergic/Immunologic:   No current allergic reactions.

## 2020-08-15 NOTE — ED ADULT NURSE NOTE - NSIMPLEMENTINTERV_GEN_ALL_ED
Implemented All Universal Safety Interventions:  [place] to call system. Call bell, personal items and telephone within reach. Instruct patient to call for assistance. Room bathroom lighting operational. Non-slip footwear when patient is off stretcher. Physically safe environment: no spills, clutter or unnecessary equipment. Stretcher in lowest position, wheels locked, appropriate side rails in place.

## 2020-08-16 LAB
CULTURE RESULTS: SIGNIFICANT CHANGE UP
SPECIMEN SOURCE: SIGNIFICANT CHANGE UP

## 2020-08-25 ENCOUNTER — EMERGENCY (EMERGENCY)
Facility: HOSPITAL | Age: 35
LOS: 1 days | Discharge: HOME | End: 2020-08-25
Attending: EMERGENCY MEDICINE
Payer: MEDICAID

## 2020-08-25 VITALS
WEIGHT: 186.07 LBS | HEIGHT: 60 IN | SYSTOLIC BLOOD PRESSURE: 128 MMHG | RESPIRATION RATE: 18 BRPM | TEMPERATURE: 98 F | OXYGEN SATURATION: 98 % | HEART RATE: 85 BPM | DIASTOLIC BLOOD PRESSURE: 83 MMHG

## 2020-08-25 DIAGNOSIS — O23.599 INFECTION OF OTHER PART OF GENITAL TRACT IN PREGNANCY, UNSPECIFIED TRIMESTER: ICD-10-CM

## 2020-08-25 DIAGNOSIS — Z3A.10 10 WEEKS GESTATION OF PREGNANCY: ICD-10-CM

## 2020-08-25 LAB
ALBUMIN SERPL ELPH-MCNC: 4.1 G/DL — SIGNIFICANT CHANGE UP (ref 3.3–5.2)
ALP SERPL-CCNC: 71 U/L — SIGNIFICANT CHANGE UP (ref 40–120)
ALT FLD-CCNC: 24 U/L — SIGNIFICANT CHANGE UP
ANION GAP SERPL CALC-SCNC: 18 MMOL/L — HIGH (ref 5–17)
APPEARANCE UR: SIGNIFICANT CHANGE UP
AST SERPL-CCNC: 25 U/L — SIGNIFICANT CHANGE UP
BACTERIA # UR AUTO: ABNORMAL
BASOPHILS # BLD AUTO: 0.06 K/UL — SIGNIFICANT CHANGE UP (ref 0–0.2)
BASOPHILS NFR BLD AUTO: 0.5 % — SIGNIFICANT CHANGE UP (ref 0–2)
BILIRUB SERPL-MCNC: 0.3 MG/DL — LOW (ref 0.4–2)
BILIRUB UR-MCNC: NEGATIVE — SIGNIFICANT CHANGE UP
BUN SERPL-MCNC: 9 MG/DL — SIGNIFICANT CHANGE UP (ref 8–20)
CALCIUM SERPL-MCNC: 9.5 MG/DL — SIGNIFICANT CHANGE UP (ref 8.6–10.2)
CHLORIDE SERPL-SCNC: 98 MMOL/L — SIGNIFICANT CHANGE UP (ref 98–107)
CO2 SERPL-SCNC: 21 MMOL/L — LOW (ref 22–29)
COLOR SPEC: SIGNIFICANT CHANGE UP
COMMENT - URINE: SIGNIFICANT CHANGE UP
CREAT SERPL-MCNC: 0.47 MG/DL — LOW (ref 0.5–1.3)
DIFF PNL FLD: ABNORMAL
EOSINOPHIL # BLD AUTO: 0.08 K/UL — SIGNIFICANT CHANGE UP (ref 0–0.5)
EOSINOPHIL NFR BLD AUTO: 0.7 % — SIGNIFICANT CHANGE UP (ref 0–6)
EPI CELLS # UR: SIGNIFICANT CHANGE UP
GLUCOSE SERPL-MCNC: 98 MG/DL — SIGNIFICANT CHANGE UP (ref 70–99)
GLUCOSE UR QL: NEGATIVE — SIGNIFICANT CHANGE UP
HCG SERPL-ACNC: HIGH MIU/ML
HCT VFR BLD CALC: 35.2 % — SIGNIFICANT CHANGE UP (ref 34.5–45)
HGB BLD-MCNC: 12.1 G/DL — SIGNIFICANT CHANGE UP (ref 11.5–15.5)
IMM GRANULOCYTES NFR BLD AUTO: 0.3 % — SIGNIFICANT CHANGE UP (ref 0–1.5)
KETONES UR-MCNC: ABNORMAL
LEUKOCYTE ESTERASE UR-ACNC: ABNORMAL
LYMPHOCYTES # BLD AUTO: 2.73 K/UL — SIGNIFICANT CHANGE UP (ref 1–3.3)
LYMPHOCYTES # BLD AUTO: 22.2 % — SIGNIFICANT CHANGE UP (ref 13–44)
MCHC RBC-ENTMCNC: 30.5 PG — SIGNIFICANT CHANGE UP (ref 27–34)
MCHC RBC-ENTMCNC: 34.4 GM/DL — SIGNIFICANT CHANGE UP (ref 32–36)
MCV RBC AUTO: 88.7 FL — SIGNIFICANT CHANGE UP (ref 80–100)
MONOCYTES # BLD AUTO: 0.82 K/UL — SIGNIFICANT CHANGE UP (ref 0–0.9)
MONOCYTES NFR BLD AUTO: 6.7 % — SIGNIFICANT CHANGE UP (ref 2–14)
NEUTROPHILS # BLD AUTO: 8.57 K/UL — HIGH (ref 1.8–7.4)
NEUTROPHILS NFR BLD AUTO: 69.6 % — SIGNIFICANT CHANGE UP (ref 43–77)
NITRITE UR-MCNC: NEGATIVE — SIGNIFICANT CHANGE UP
PH UR: 5 — SIGNIFICANT CHANGE UP (ref 5–8)
PLATELET # BLD AUTO: 281 K/UL — SIGNIFICANT CHANGE UP (ref 150–400)
POTASSIUM SERPL-MCNC: 3.6 MMOL/L — SIGNIFICANT CHANGE UP (ref 3.5–5.3)
POTASSIUM SERPL-SCNC: 3.6 MMOL/L — SIGNIFICANT CHANGE UP (ref 3.5–5.3)
PROT SERPL-MCNC: 7.5 G/DL — SIGNIFICANT CHANGE UP (ref 6.6–8.7)
PROT UR-MCNC: 30 MG/DL
RBC # BLD: 3.97 M/UL — SIGNIFICANT CHANGE UP (ref 3.8–5.2)
RBC # FLD: 12.5 % — SIGNIFICANT CHANGE UP (ref 10.3–14.5)
RBC CASTS # UR COMP ASSIST: SIGNIFICANT CHANGE UP /HPF (ref 0–4)
SODIUM SERPL-SCNC: 137 MMOL/L — SIGNIFICANT CHANGE UP (ref 135–145)
SP GR SPEC: 1.02 — SIGNIFICANT CHANGE UP (ref 1.01–1.02)
UROBILINOGEN FLD QL: NEGATIVE — SIGNIFICANT CHANGE UP
WBC # BLD: 12.3 K/UL — HIGH (ref 3.8–10.5)
WBC # FLD AUTO: 12.3 K/UL — HIGH (ref 3.8–10.5)
WBC UR QL: NEGATIVE — SIGNIFICANT CHANGE UP

## 2020-08-25 PROCEDURE — 76801 OB US < 14 WKS SINGLE FETUS: CPT

## 2020-08-25 PROCEDURE — 76801 OB US < 14 WKS SINGLE FETUS: CPT | Mod: 26

## 2020-08-25 PROCEDURE — 99284 EMERGENCY DEPT VISIT MOD MDM: CPT

## 2020-08-25 PROCEDURE — T1013: CPT

## 2020-08-25 PROCEDURE — 87086 URINE CULTURE/COLONY COUNT: CPT

## 2020-08-25 PROCEDURE — 84702 CHORIONIC GONADOTROPIN TEST: CPT

## 2020-08-25 PROCEDURE — 81001 URINALYSIS AUTO W/SCOPE: CPT

## 2020-08-25 PROCEDURE — 99285 EMERGENCY DEPT VISIT HI MDM: CPT

## 2020-08-25 PROCEDURE — 85027 COMPLETE CBC AUTOMATED: CPT

## 2020-08-25 PROCEDURE — 80053 COMPREHEN METABOLIC PANEL: CPT

## 2020-08-25 PROCEDURE — 36415 COLL VENOUS BLD VENIPUNCTURE: CPT

## 2020-08-25 RX ORDER — METRONIDAZOLE 7.5 MG/G
1 GEL VAGINAL
Qty: 5 | Refills: 0
Start: 2020-08-25 | End: 2020-08-29

## 2020-08-25 NOTE — ED ADULT NURSE NOTE - OBJECTIVE STATEMENT
pt ambulatory into ED a&ox3, no acute distress c/o UTI x 1 week now with increasing pain and burning on urination and vomiting. denies fever.  pt stated that is 10 weeks pregnant.

## 2020-08-25 NOTE — ED PROVIDER NOTE - PATIENT PORTAL LINK FT
You can access the FollowMyHealth Patient Portal offered by [facility] by registering at the following website: [email or web address]. By joining [employer]’s FollowMyHealth portal, you will also be able to view your health information using other applications (apps) compatible with our system.

## 2020-08-25 NOTE — ED PROVIDER NOTE - NSFOLLOWUPINSTRUCTIONS_ED_ALL_ED_FT
Take medication and follow up with GYN at [facility] clinic within 1 week.  Return to ED if symptoms worsen or persist.

## 2020-08-25 NOTE — CONSULT NOTE ADULT - ASSESSMENT
33yo  at 10w4d GA by LMP () is being evaluated for persistent dysuria. Patient is stable. VSS and PE benign. BV likely as patient's UA is negative and she is now endorsing malodorous vaginal discharge. Ultrasound images show an IUP with . Patient can be sent home with metronidazole gel for 5 days and she is to follow up with Dr. Oglesby.     Discussed with Dr. Oglesby.

## 2020-08-25 NOTE — CONSULT NOTE ADULT - SUBJECTIVE AND OBJECTIVE BOX
33yo  at 10w4d GA by LMP () presents with persistent dysuria for 2 weeks. She was seen in the ED 10 days ago for dysuria and was found to have a UTI and sent home with keflex. She has been taking her antibiotics and continues to have dysuria. She is now endorsing some malodorous vaginal discharge. She denies fevers, chills, nausea, vomiting and vaginal bleeding. She has no other complaints at this time.     POB: NSVDx3, SABx7  PMH: denies  PSH: denies  Meds: PNVs  All: NKDA     Vital Signs Last 24 Hrs  T(C): 36.8 (25 Aug 2020 13:34), Max: 36.8 (25 Aug 2020 13:34)  T(F): 98.3 (25 Aug 2020 13:34), Max: 98.3 (25 Aug 2020 13:34)  HR: 85 (25 Aug 2020 13:34) (85 - 85)  BP: 128/83 (25 Aug 2020 13:34) (128/83 - 128/83)  RR: 18 (25 Aug 2020 13:34) (18 - 18)  SpO2: 98% (25 Aug 2020 13:34) (98% - 98%)    Gen: NAD, well-appearing   Abd: soft, NT/ND, no rebound or guarding   Ext: non-tender, non-edematous  Pelvic: deferred     Urinalysis Basic - ( 25 Aug 2020 14:53 )  Color: Pale Yellow / Appearance: Turbid / S.025 / pH: x  Gluc: x / Ketone: Trace  / Bili: Negative / Urobili: Negative   Blood: x / Protein: 30 mg/dL / Nitrite: Negative   Leuk Esterase: Trace / RBC: 0-2 /HPF / WBC Negative   Sq Epi: x / Non Sq Epi: Occasional / Bacteria: Occasional    HCG Quantitative, Serum (20 @ 14:47)  HCG Quantitative, Serum: 68854.0    Ultrasound (): normal IUP with

## 2020-08-25 NOTE — ED PROVIDER NOTE - CARE PLAN
Principal Discharge DX:	Pelvic pain during pregnancy Principal Discharge DX:	Pelvic pain during pregnancy  Secondary Diagnosis:	Bacterial vaginosis in pregnancy

## 2020-08-25 NOTE — ED PROVIDER NOTE - OBJECTIVE STATEMENT
33 y/o female with PMHx of spontaneous abortions, migraines presents to ED c/o worsening dysuria x 1 week. She admits was in ED evaluated for similar, told had UTI, took antibiotics with no relief.  She notes persistent lower pelvic pain.  Patient is about 10 weeks pregnant, [id number] reports abdominal pain, with a burning sensation, also reports vaginal pain and burning with urination. Reports symptoms for the past 2 weeks, but worsened today. She denies any fevers, chi 33 y/o female with PMHx of spontaneous abortions, migraines presents to ED c/o worsening dysuria x 1 week. She admits was in ED evaluated for similar, told had UTI, took antibiotics with no relief.  She notes persistent lower pelvic pain.  Patient is about 10 weeks pregnant, [id number] reports abdominal pain, with a burning sensation, also reports vaginal pain and burning with urination. Reports symptoms for the past 2 weeks, but worsened today. She denies any fevers, chills, sick contacts, recent travel or rashes.  Patient f/u with [facility] clinic.

## 2020-08-25 NOTE — ED PROVIDER NOTE - ATTENDING CONTRIBUTION TO CARE
Patient seen with PA.  persistent  sx without sig growth from prior UCx.  Ultrasound demonstrates no acute pathology. Gyn recommendations followd.  will f/u.  Uneventful ED observation period. Non toxic.  Well appearing. Patient signed out to incoming physician.  All decisions regarding the progression of care will be made at their discretion.

## 2020-08-25 NOTE — ED PROVIDER NOTE - NS ED ROS FT
No fever/chills, No photophobia/eye pain/changes in vision, No ear pain/sore throat/dysphagia, No chest pain/palpitations, no SOB/cough/wheeze/stridor, +pelvic pain, abdominal pain, No N/V/D, +dysuria/frequency/discharge, No neck/back pain, no rash, no changes in neurological status/function.

## 2020-08-25 NOTE — ED PROVIDER NOTE - PROGRESS NOTE DETAILS
labs and UA and US reviewed, still no yolk sac, seen by GYN dx BV reccomending metro gel, to f/u with MD Gerber in clinic

## 2020-08-25 NOTE — ED ADULT TRIAGE NOTE - CHIEF COMPLAINT QUOTE
pt ambulatory into ED a&ox3, no acute distress c/o UTI x 1 week now with increasing pain and burning on urination and vomiting. denies fever.

## 2020-08-26 LAB
CULTURE RESULTS: SIGNIFICANT CHANGE UP
SPECIMEN SOURCE: SIGNIFICANT CHANGE UP

## 2020-09-01 ENCOUNTER — EMERGENCY (EMERGENCY)
Facility: HOSPITAL | Age: 35
LOS: 1 days | Discharge: HOME | End: 2020-09-01
Attending: EMERGENCY MEDICINE
Payer: COMMERCIAL

## 2020-09-01 ENCOUNTER — OUTPATIENT (OUTPATIENT)
Dept: OUTPATIENT SERVICES | Facility: HOSPITAL | Age: 35
LOS: 1 days | End: 2020-09-01
Payer: MEDICAID

## 2020-09-01 VITALS
SYSTOLIC BLOOD PRESSURE: 120 MMHG | HEART RATE: 73 BPM | WEIGHT: 186.07 LBS | OXYGEN SATURATION: 99 % | RESPIRATION RATE: 16 BRPM | TEMPERATURE: 98 F | HEIGHT: 60 IN | DIASTOLIC BLOOD PRESSURE: 83 MMHG

## 2020-09-01 LAB
ANION GAP SERPL CALC-SCNC: 16 MMOL/L — SIGNIFICANT CHANGE UP (ref 5–17)
BASOPHILS # BLD AUTO: 0.04 K/UL — SIGNIFICANT CHANGE UP (ref 0–0.2)
BASOPHILS NFR BLD AUTO: 0.4 % — SIGNIFICANT CHANGE UP (ref 0–2)
BUN SERPL-MCNC: 4 MG/DL — LOW (ref 8–20)
CALCIUM SERPL-MCNC: 9.6 MG/DL — SIGNIFICANT CHANGE UP (ref 8.6–10.2)
CHLORIDE SERPL-SCNC: 102 MMOL/L — SIGNIFICANT CHANGE UP (ref 98–107)
CO2 SERPL-SCNC: 19 MMOL/L — LOW (ref 22–29)
CREAT SERPL-MCNC: 0.41 MG/DL — LOW (ref 0.5–1.3)
EOSINOPHIL # BLD AUTO: 0.17 K/UL — SIGNIFICANT CHANGE UP (ref 0–0.5)
EOSINOPHIL NFR BLD AUTO: 1.7 % — SIGNIFICANT CHANGE UP (ref 0–6)
GLUCOSE SERPL-MCNC: 136 MG/DL — HIGH (ref 70–99)
HCG SERPL-ACNC: HIGH MIU/ML
HCT VFR BLD CALC: 35.2 % — SIGNIFICANT CHANGE UP (ref 34.5–45)
HGB BLD-MCNC: 12 G/DL — SIGNIFICANT CHANGE UP (ref 11.5–15.5)
IMM GRANULOCYTES NFR BLD AUTO: 0.4 % — SIGNIFICANT CHANGE UP (ref 0–1.5)
LYMPHOCYTES # BLD AUTO: 2.92 K/UL — SIGNIFICANT CHANGE UP (ref 1–3.3)
LYMPHOCYTES # BLD AUTO: 29.1 % — SIGNIFICANT CHANGE UP (ref 13–44)
MCHC RBC-ENTMCNC: 30.5 PG — SIGNIFICANT CHANGE UP (ref 27–34)
MCHC RBC-ENTMCNC: 34.1 GM/DL — SIGNIFICANT CHANGE UP (ref 32–36)
MCV RBC AUTO: 89.6 FL — SIGNIFICANT CHANGE UP (ref 80–100)
MONOCYTES # BLD AUTO: 0.53 K/UL — SIGNIFICANT CHANGE UP (ref 0–0.9)
MONOCYTES NFR BLD AUTO: 5.3 % — SIGNIFICANT CHANGE UP (ref 2–14)
NEUTROPHILS # BLD AUTO: 6.35 K/UL — SIGNIFICANT CHANGE UP (ref 1.8–7.4)
NEUTROPHILS NFR BLD AUTO: 63.1 % — SIGNIFICANT CHANGE UP (ref 43–77)
PLATELET # BLD AUTO: 288 K/UL — SIGNIFICANT CHANGE UP (ref 150–400)
POTASSIUM SERPL-MCNC: 3.6 MMOL/L — SIGNIFICANT CHANGE UP (ref 3.5–5.3)
POTASSIUM SERPL-SCNC: 3.6 MMOL/L — SIGNIFICANT CHANGE UP (ref 3.5–5.3)
RBC # BLD: 3.93 M/UL — SIGNIFICANT CHANGE UP (ref 3.8–5.2)
RBC # FLD: 12.4 % — SIGNIFICANT CHANGE UP (ref 10.3–14.5)
SODIUM SERPL-SCNC: 137 MMOL/L — SIGNIFICANT CHANGE UP (ref 135–145)
WBC # BLD: 10.05 K/UL — SIGNIFICANT CHANGE UP (ref 3.8–10.5)
WBC # FLD AUTO: 10.05 K/UL — SIGNIFICANT CHANGE UP (ref 3.8–10.5)

## 2020-09-01 PROCEDURE — 76801 OB US < 14 WKS SINGLE FETUS: CPT | Mod: 26

## 2020-09-01 PROCEDURE — 99284 EMERGENCY DEPT VISIT MOD MDM: CPT

## 2020-09-01 PROCEDURE — 84702 CHORIONIC GONADOTROPIN TEST: CPT

## 2020-09-01 PROCEDURE — 99285 EMERGENCY DEPT VISIT HI MDM: CPT

## 2020-09-01 PROCEDURE — 76801 OB US < 14 WKS SINGLE FETUS: CPT

## 2020-09-01 PROCEDURE — 80048 BASIC METABOLIC PNL TOTAL CA: CPT

## 2020-09-01 PROCEDURE — 85027 COMPLETE CBC AUTOMATED: CPT

## 2020-09-01 PROCEDURE — 76815 OB US LIMITED FETUS(S): CPT | Mod: 26

## 2020-09-01 PROCEDURE — 76815 OB US LIMITED FETUS(S): CPT

## 2020-09-01 PROCEDURE — 36415 COLL VENOUS BLD VENIPUNCTURE: CPT

## 2020-09-01 NOTE — ED STATDOCS - PATIENT PORTAL LINK FT
You can access the FollowMyHealth Patient Portal offered by [facility] by registering at the following website: [email or web address]. By joining [employer]’s FollowMyHealth portal, you will also be able to view your health information using other applications (apps) compatible with our system.

## 2020-09-01 NOTE — ED STATDOCS - NSFOLLOWUPINSTRUCTIONS_ED_ALL_ED_FT
1) You will need to follow up with [facility], bring all of your results with you  2) return to the ED if you have any concerning symptoms  3) no sex

## 2020-09-01 NOTE — ED STATDOCS - CLINICAL SUMMARY MEDICAL DECISION MAKING FREE TEXT BOX
In light of discharge that she is describing and previous results indicating poor response of her hCG levels and no evidence of a yolk sac, will repeat basic labs and sonogram today.

## 2020-09-01 NOTE — ED STATDOCS - ATTENDING CONTRIBUTION TO CARE
I, Rodri Nelson, performed the initial face to face bedside interview with this patient regarding history of present illness, review of symptoms and relevant past medical, social and family history.  I completed an independent physical examination.  I was the initial provider who evaluated this patient. I have signed out the follow up of any pending tests (i.e. labs, radiological studies) to the ACP.  I have communicated the patient’s plan of care and disposition with the ACP.

## 2020-09-01 NOTE — ED STATDOCS - OBJECTIVE STATEMENT
35 y/o F pt with no significant PMHx presents to the ED c/o vaginal discharge that is similar to last time she had a miscarriage. 12 weeks pregnant. Felt baby move constantly but movement has stopped today. [id number]. Pt has seen brown discharge in the past before she had a miscarriage and states that her current discharge is similar. Concerned she is currently having another miscarriage. Has not seen provider recently. Has not been told she has pregnancy problem. Not on daily medications. Denies hx of surgeries. No further complaints at this time.

## 2020-09-01 NOTE — ED ADULT TRIAGE NOTE - CHIEF COMPLAINT QUOTE
Pt states  she is 12 weeks pregnant and has not felt the baby move since last night.   Denies pain or vaginal bleeding.

## 2020-09-01 NOTE — ED ADULT NURSE NOTE - NSIMPLEMENTINTERV_GEN_ALL_ED
Implemented All Universal Safety Interventions:  [place] to call system. Call bell, personal items and telephone within reach. Instruct patient to call for assistance. Room bathroom lighting operational. Non-slip footwear when patient is off stretcher. Physically safe environment: no spills, clutter or unnecessary equipment. Stretcher in lowest position, wheels locked, appropriate side rails in place.

## 2020-09-01 NOTE — ED STATDOCS - PROGRESS NOTE DETAILS
NP NOTE:  Charting and results reviewed.  Beta HCG decreased from 8/25 >53712 to 46575 today.  Yolk sac normal.  Single live IUP, .  Will need to f/u [facility] clinic.

## 2020-09-02 ENCOUNTER — EMERGENCY (EMERGENCY)
Facility: HOSPITAL | Age: 35
LOS: 1 days | Discharge: HOME | End: 2020-09-02
Attending: STUDENT IN AN ORGANIZED HEALTH CARE EDUCATION/TRAINING PROGRAM
Payer: COMMERCIAL

## 2020-09-02 VITALS
HEIGHT: 60 IN | RESPIRATION RATE: 18 BRPM | HEART RATE: 92 BPM | SYSTOLIC BLOOD PRESSURE: 129 MMHG | DIASTOLIC BLOOD PRESSURE: 87 MMHG | OXYGEN SATURATION: 97 % | TEMPERATURE: 98 F | WEIGHT: 186.07 LBS

## 2020-09-02 VITALS
DIASTOLIC BLOOD PRESSURE: 85 MMHG | SYSTOLIC BLOOD PRESSURE: 135 MMHG | OXYGEN SATURATION: 98 % | TEMPERATURE: 98 F | HEART RATE: 85 BPM | RESPIRATION RATE: 18 BRPM

## 2020-09-02 DIAGNOSIS — O20.0 THREATENED ABORTION: ICD-10-CM

## 2020-09-02 LAB
APPEARANCE UR: CLEAR — SIGNIFICANT CHANGE UP
BACTERIA # UR AUTO: ABNORMAL
BILIRUB UR-MCNC: NEGATIVE — SIGNIFICANT CHANGE UP
COLOR SPEC: YELLOW — SIGNIFICANT CHANGE UP
DIFF PNL FLD: ABNORMAL
EPI CELLS # UR: SIGNIFICANT CHANGE UP
GLUCOSE UR QL: NEGATIVE MG/DL — SIGNIFICANT CHANGE UP
KETONES UR-MCNC: NEGATIVE — SIGNIFICANT CHANGE UP
LEUKOCYTE ESTERASE UR-ACNC: ABNORMAL
NITRITE UR-MCNC: NEGATIVE — SIGNIFICANT CHANGE UP
PH UR: 6.5 — SIGNIFICANT CHANGE UP (ref 5–8)
PROT UR-MCNC: NEGATIVE MG/DL — SIGNIFICANT CHANGE UP
RBC CASTS # UR COMP ASSIST: SIGNIFICANT CHANGE UP /HPF (ref 0–4)
SP GR SPEC: 1 — LOW (ref 1.01–1.02)
UROBILINOGEN FLD QL: NEGATIVE MG/DL — SIGNIFICANT CHANGE UP
WBC UR QL: SIGNIFICANT CHANGE UP

## 2020-09-02 PROCEDURE — 81001 URINALYSIS AUTO W/SCOPE: CPT

## 2020-09-02 PROCEDURE — T1013: CPT

## 2020-09-02 PROCEDURE — 87086 URINE CULTURE/COLONY COUNT: CPT

## 2020-09-02 PROCEDURE — 99284 EMERGENCY DEPT VISIT MOD MDM: CPT | Mod: 25

## 2020-09-02 PROCEDURE — 99284 EMERGENCY DEPT VISIT MOD MDM: CPT

## 2020-09-02 RX ORDER — CEPHALEXIN 500 MG
1 CAPSULE ORAL
Qty: 14 | Refills: 0
Start: 2020-09-02 | End: 2020-09-08

## 2020-09-02 RX ORDER — ACETAMINOPHEN 500 MG
650 TABLET ORAL ONCE
Refills: 0 | Status: COMPLETED | OUTPATIENT
Start: 2020-09-02 | End: 2020-09-02

## 2020-09-02 RX ORDER — CEPHALEXIN 500 MG
500 CAPSULE ORAL ONCE
Refills: 0 | Status: COMPLETED | OUTPATIENT
Start: 2020-09-02 | End: 2020-09-02

## 2020-09-02 RX ADMIN — Medication 500 MILLIGRAM(S): at 06:46

## 2020-09-02 RX ADMIN — Medication 650 MILLIGRAM(S): at 04:53

## 2020-09-02 NOTE — CONSULT NOTE ADULT - SUBJECTIVE AND OBJECTIVE BOX
Name: CHRISTINE DOZIER  MRN: [id number]  Allergies:     CHRISTINE DOZIER is a 34y  at 13w1d by LMP 2020 presenting with vaginal spotting and cramping which began today    PAST OBSTETRICAL HISTORY:   x3  SAB x7    PAST GYN HISTORY: Denies history of abnormal paps, STDs, ovarian cysts, or uterine fibroids.     PAST MEDICAL HISTORY:  Migraine    PAST SURGICAL HISTORY:  No significant past surgical history    Home Medications: PNVs    SOCIAL:  Denies tobacco, alcohol or drug use. Feels safe at home.     NKDA    Vital Signs Last 24 Hrs  T(C): 36.7 (02 Sep 2020 03:43), Max: 36.8 (01 Sep 2020 16:50)  T(F): 98 (02 Sep 2020 03:43), Max: 98.3 (01 Sep 2020 16:50)  HR: 92 (02 Sep 2020 03:43) (73 - 92)  BP: 129/87 (02 Sep 2020 03:43) (120/83 - 129/87)  RR: 18 (02 Sep 2020 03:43) (16 - 18)  SpO2: 97% (02 Sep 2020 03:43) (97% - 99%)    PHYSICAL EXAM:  GEN: NAD, AOx3  Lung: Speaking in full sentences without shortness of breath.  Abd: Soft, Nontender, Nondistended. No rebound tenderness or guarding.   PELVIC:        EXTERNAL GENITALIA: atraumatic, no lesions        VAGINA: No discharge or active bleeding         CERVIX: Pink, closed        UTERUS: appropriate size        ADNEXA: not palpable    LABS:                        12.0   10.05 )-----------( 288      ( 01 Sep 2020 17:27 )             35.2         137  |  102  |  4.0<L>  ----------------------------<  136<H>  3.6   |  19.0<L>  |  0.41<L>    Ca    9.6      01 Sep 2020 17:27        HCG Quantitative, Serum: 05940.0 mIU/mL (20 @ 17:27)        RADIOLOGY STUDIES: Name: CHRISTINE DOZIER  MRN: [id number]  Allergies:     CHRISTINE DOZIER is a 34y  at 13w1d by LMP 2020 presenting with vaginal spotting and cramping which began yesterday and became more painful today. Patient presented to the ED last night with similar complaints and was discharged in stable condition. Sonogram and labs at that time were WNL. Patient presented again today due to increasing pain. Currently endorses  cramping and spotting. Denies dysuria, changes in bowel habits, fevers, or chills. This is a desired pregnancy.    Patient gets her PNC at [facility].    PAST OBSTETRICAL HISTORY:   x3  SAB x7    PAST GYN HISTORY: Denies history of abnormal paps, STDs, ovarian cysts, or uterine fibroids.     PAST MEDICAL HISTORY:  Migraine    PAST SURGICAL HISTORY:  No significant past surgical history    Home Medications: PNVs    SOCIAL:  Denies tobacco, alcohol or drug use. Feels safe at home.     NKDA    Vital Signs Last 24 Hrs  T(C): 36.7 (02 Sep 2020 03:43), Max: 36.8 (01 Sep 2020 16:50)  T(F): 98 (02 Sep 2020 03:43), Max: 98.3 (01 Sep 2020 16:50)  HR: 92 (02 Sep 2020 03:43) (73 - 92)  BP: 129/87 (02 Sep 2020 03:43) (120/83 - 129/87)  RR: 18 (02 Sep 2020 03:43) (16 - 18)  SpO2: 97% (02 Sep 2020 03:43) (97% - 99%)    PHYSICAL EXAM:  GEN: NAD, AOx3  Lung: Speaking in full sentences without shortness of breath.  Abd: Soft, Nontender, Nondistended. No rebound tenderness or guarding.   Back: no CVA tenderness bilaterally  PELVIC:        EXTERNAL GENITALIA: atraumatic, no lesions        VAGINA: Frothy discharge, pink-tinged         CERVIX: Pink, closed        UTERUS: appropriate size        ADNEXA: not palpable    LABS:                        12.0   10.05 )-----------( 288      ( 01 Sep 2020 17:27 )             35.2     09-01    137  |  102  |  4.0<L>  ----------------------------<  136<H>  3.6   |  19.0<L>  |  0.41<L>    Ca    9.6      01 Sep 2020 17:27    HCG Quantitative, Serum: 83246.0 mIU/mL (20 @ 17:27)    RADIOLOGY STUDIES:  < from: US OB <=14 Weeks, First Gestation (20 @ 18:05) >  INTERPRETATION:  CLINICAL INFORMATION: Vaginal discharge. Early pregnancy.    LMP: 2020    Estimated Gestational Age by LMP: 11 weeks 4 days    COMPARISON: 2020    Transabdominal pelvic sonogram only. Color and Spectral Doppler was performed.    FINDINGS:  Uterus: 12.1 x 7 x 8.2 cm. Cervix is measured 4.2 cm in length and closed. 1.6 x 1.7 x 1.4 cm sized nabothian cyst.    Gestational Sac Size (mean): 4.4 cm (10 weeks 0 days)    Crown Rump Length: 4.8 cm  Estimated Gestational Age: 11 weeks 4 days.  Yolk Sac: Normal.  Fetal Heart Rate: 162 bpm.    Right ovary: 2.2 x 2.6 x 2.4 cm. Within normal limits. Normal arterial and venous waveforms.  Left ovary: 2.7 x 3.2 x 2.9 cm. Within normal limits. Normal arterial and venous waveforms.    Fluid: None.    IMPRESSION:  Single live intrauterine pregnancy.  Estimated gestational age of 11 weeks 4 days.  No evidence of abscess or abnormalfluid collection.    < end of copied text > Name: CHRISTINE DOZIER  MRN: [id number]  Allergies:     CHRISTINE DOZIER is a 34y  at 13w1d by LMP 2020 presenting with vaginal spotting and cramping which began yesterday and became more painful today. Patient presented to the ED last night with similar complaints and was discharged in stable condition. Sonogram and labs at that time were WNL. Patient presented again today due to increasing pain. Currently endorses  cramping and spotting. Denies dysuria, changes in bowel habits, fevers, or chills. This is a desired pregnancy.    Patient gets her PNC at [facility].    PAST OBSTETRICAL HISTORY:   x3  SAB x7    PAST GYN HISTORY: Denies history of abnormal paps, STDs, ovarian cysts, or uterine fibroids.     PAST MEDICAL HISTORY:  Migraine    PAST SURGICAL HISTORY:  No significant past surgical history    Home Medications: PNVs    SOCIAL:  Denies tobacco, alcohol or drug use. Feels safe at home.     NKDA    Vital Signs Last 24 Hrs  T(C): 36.7 (02 Sep 2020 03:43), Max: 36.8 (01 Sep 2020 16:50)  T(F): 98 (02 Sep 2020 03:43), Max: 98.3 (01 Sep 2020 16:50)  HR: 92 (02 Sep 2020 03:43) (73 - 92)  BP: 129/87 (02 Sep 2020 03:43) (120/83 - 129/87)  RR: 18 (02 Sep 2020 03:43) (16 - 18)  SpO2: 97% (02 Sep 2020 03:43) (97% - 99%)    PHYSICAL EXAM:  GEN: NAD, AOx3  Lung: Speaking in full sentences without shortness of breath.  Abd: Soft, Nontender, Nondistended. No rebound tenderness or guarding.   Back: no CVA tenderness bilaterally  PELVIC:        EXTERNAL GENITALIA: atraumatic, no lesions        VAGINA: Frothy discharge, pink-tinged, no active bleeding, no clots         CERVIX: Pink, closed        UTERUS: appropriate size        ADNEXA: not palpable    LABS:                        12.0   10.05 )-----------( 288      ( 01 Sep 2020 17:27 )             35.2     09-01    137  |  102  |  4.0<L>  ----------------------------<  136<H>  3.6   |  19.0<L>  |  0.41<L>    Ca    9.6      01 Sep 2020 17:27    HCG Quantitative, Serum: 25254.0 mIU/mL (20 @ 17:27)    RADIOLOGY STUDIES:  < from: US OB <=14 Weeks, First Gestation (20 @ 18:05) >  INTERPRETATION:  CLINICAL INFORMATION: Vaginal discharge. Early pregnancy.    LMP: 2020    Estimated Gestational Age by LMP: 11 weeks 4 days    COMPARISON: 2020    Transabdominal pelvic sonogram only. Color and Spectral Doppler was performed.    FINDINGS:  Uterus: 12.1 x 7 x 8.2 cm. Cervix is measured 4.2 cm in length and closed. 1.6 x 1.7 x 1.4 cm sized nabothian cyst.    Gestational Sac Size (mean): 4.4 cm (10 weeks 0 days)    Crown Rump Length: 4.8 cm  Estimated Gestational Age: 11 weeks 4 days.  Yolk Sac: Normal.  Fetal Heart Rate: 162 bpm.    Right ovary: 2.2 x 2.6 x 2.4 cm. Within normal limits. Normal arterial and venous waveforms.  Left ovary: 2.7 x 3.2 x 2.9 cm. Within normal limits. Normal arterial and venous waveforms.    Fluid: None.    IMPRESSION:  Single live intrauterine pregnancy.  Estimated gestational age of 11 weeks 4 days.  No evidence of abscess or abnormalfluid collection.    < end of copied text >

## 2020-09-02 NOTE — ED PROVIDER NOTE - OBJECTIVE STATEMENT
33 yo [id number] female who is 11 weeks pregnant presents with contractions beginning around 12 this morning. The patient states that her contractions are sharp, occur every 1-2 minutes. Additionally, she has noticed some vaginal spotting that ceased around 1 am. The patient has not taken any medication for her contraction pain. She denies any recent vaginal intercourse or other trauma to the region. She reports that she is sexually active with one male partner and that her/her partner do not have any STDs. The patient was seen in the ED twelve hours ago, underwent formal sonographic ultrasound which revealed one single intrauterine pregnancy with fetal heart rate of 162. The patient states that she has experienced 5 spontaneous abortions in the past and that her current symptoms mimic how she felt when she underwent these miscarriages in the past. The patient has not had any prenatal care to date, though is due to follow with [facility] clinic. The patient denies nausea, vomiting, chest pain, SOB, urinary symptoms, fever. 35 yo [id number] female who is 11 weeks pregnant presents with contractions beginning around 12 this morning. The patient states that her contractions are sharp, occur every 1-2 minutes. Additionally, she has noticed some vaginal spotting that ceased around 1 am. The patient has not taken any medication for her contraction pain. She denies any recent vaginal intercourse or other trauma to the region. She reports that she is sexually active with one male partner and that her/her partner do not have any STDs. The patient was seen in the ED twelve hours ago, underwent formal sonographic ultrasound which revealed one single intrauterine pregnancy with fetal heart rate of 162. The patient states that she has experienced 5 spontaneous abortions in the past and that her current symptoms mimic how she felt when she underwent these miscarriages in the past. The patient has not had any prenatal care to date, though is due to follow with [facility] clinic. The patient denies nausea, vomiting, chest pain, SOB, urinary symptoms, fever.

## 2020-09-02 NOTE — ED PROVIDER NOTE - CLINICAL SUMMARY MEDICAL DECISION MAKING FREE TEXT BOX
35 yo female presents w contractions beginning around 12 this morning. Consider causes of her bleeding including spontaneous . Will order CBC, Type and Screen, consult obstetrics for recommendations and further management. 33 yo female presents w contractions beginning around 12 this morning. Consider causes of her bleeding including spontaneous . Consult obstetrics for recommendations and further management.

## 2020-09-02 NOTE — ED ADULT NURSE NOTE - OBJECTIVE STATEMENT
Pt resting comfortably in stretcher, A&Ox3, NAD noted, respirations even and nonlabored, NSR on monitor. Pt presents 11 weeks pregnant with abdominal cramping and vaginal spotting. pt with past hx of spontaneous abortions and states current symptoms feel the same.

## 2020-09-02 NOTE — CONSULT NOTE ADULT - ASSESSMENT
34y  at 13w1d by LMP  here with threatened .  She is RH Pos, thus Rhogam is not indicated 34y  at 13w1d by LMP  here with threatened .   Bedside sonogram by ED confirmed FH.   Spotting noted on speculum exam.   Cervix is closed.  She is RH Pos, thus Rhogam is not indicated    Recommendations:   Tylenol 1000mg q6h for pain control  Consider UA and urine culture to r/o UTI  Stable for DC from OB perspective    d/w Dr. Acevedo 34y  at 13w1d by LMP  here with threatened .   Bedside sonogram by ED confirmed FH.   Spotting noted on speculum exam. No clots visualized.  Cervix is closed.  She is RH Pos, thus Rhogam is not indicated    Recommendations:   Tylenol 1000mg q6h for pain control  Consider UA and urine culture to r/o UTI  Stable for DC from OB perspective    d/w Dr. Acevedo

## 2020-09-02 NOTE — ED ADULT TRIAGE NOTE - CHIEF COMPLAINT QUOTE
PT presents to ED for cramping pt states she is 11 weeks pregnant with small vaginal bleeding and clots

## 2020-09-02 NOTE — ED ADULT TRIAGE NOTE - LANGUAGE ASSISTANCE NEEDED
Patient is returning phone call. Ok to leave a detailed message.    Yes-Patient/Caregiver accepts free interpretation services...

## 2020-09-02 NOTE — ED ADULT NURSE NOTE - NSIMPLEMENTINTERV_GEN_ALL_ED
Implemented All Universal Safety Interventions:  [place] to call system. Call bell, personal items and telephone within reach. Instruct patient to call for assistance. Room bathroom lighting operational. Non-slip footwear when patient is off stretcher. Physically safe environment: no spills, clutter or unnecessary equipment. Stretcher in lowest position, wheels locked, appropriate side rails in place.

## 2020-09-02 NOTE — ED PROVIDER NOTE - PATIENT PORTAL LINK FT
You can access the FollowMyHealth Patient Portal offered by [facility] by registering at the following website: [email or web address]. By joining [employer]s FollowMyHealth portal, you will also be able to view your health information using other applications (apps) compatible with our system.

## 2020-09-03 LAB
CULTURE RESULTS: SIGNIFICANT CHANGE UP
SPECIMEN SOURCE: SIGNIFICANT CHANGE UP

## 2020-09-23 DIAGNOSIS — Z71.89 OTHER SPECIFIED COUNSELING: ICD-10-CM

## 2020-10-08 DIAGNOSIS — Z71.89 OTHER SPECIFIED COUNSELING: ICD-10-CM

## 2021-03-05 NOTE — ED STATDOCS - PROGRESS NOTE DETAILS
Pt Ct head and labs negative. Discussed with pt and she is comfortable with discharge to follow up with optometry and [facility] clinic.
Present (15 x15 bpm)

## 2021-12-01 PROCEDURE — G9005: CPT

## 2022-11-11 ENCOUNTER — OFFICE (OUTPATIENT)
Dept: URBAN - METROPOLITAN AREA CLINIC 116 | Facility: CLINIC | Age: 37
Setting detail: OPHTHALMOLOGY
End: 2022-11-11
Payer: COMMERCIAL

## 2022-11-11 DIAGNOSIS — E11.9: ICD-10-CM

## 2022-11-11 DIAGNOSIS — H16.223: ICD-10-CM

## 2022-11-11 DIAGNOSIS — H16.221: ICD-10-CM

## 2022-11-11 DIAGNOSIS — H16.222: ICD-10-CM

## 2022-11-11 DIAGNOSIS — H43.393: ICD-10-CM

## 2022-11-11 PROCEDURE — 92250 FUNDUS PHOTOGRAPHY W/I&R: CPT | Performed by: OPTOMETRIST

## 2022-11-11 PROCEDURE — 92014 COMPRE OPH EXAM EST PT 1/>: CPT | Performed by: OPTOMETRIST

## 2022-11-11 ASSESSMENT — REFRACTION_MANIFEST
OU_VA: 20/25-
OS_VA1: 20/25-
OS_AXIS: 005
OS_CYLINDER: -3.75
OS_SPHERE: -1.00
OS_CYLINDER: -4.00
OD_CYLINDER: -4.00
OS_CYLINDER: -4.00
OS_SPHERE: -1.00
OD_VA1: 20/25-
OD_AXIS: 005
OD_SPHERE: -1.00
OS_AXIS: 180
OS_VA1: 20/25
OS_VA1: 20/20
OS_AXIS: 180
OS_SPHERE: -1.00
OD_VA1: 20/25
OD_AXIS: 180
OD_SPHERE: -1.00
OD_SPHERE: -1.25
OD_VA1: 20/20
OD_CYLINDER: -3.50
OD_AXIS: 5
OD_CYLINDER: -4.00

## 2022-11-11 ASSESSMENT — SPHEQUIV_DERIVED
OD_SPHEQUIV: -2.75
OD_SPHEQUIV: -3.875
OD_SPHEQUIV: -3.25
OS_SPHEQUIV: -2.875
OD_SPHEQUIV: -3
OS_SPHEQUIV: -3
OS_SPHEQUIV: -3
OS_SPHEQUIV: -3.625

## 2022-11-11 ASSESSMENT — REFRACTION_AUTOREFRACTION
OD_CYLINDER: -4.75
OD_SPHERE: -1.50
OS_AXIS: 180
OS_CYLINDER: -4.25
OS_SPHERE: -1.50
OD_AXIS: 005

## 2022-11-11 ASSESSMENT — AXIALLENGTH_DERIVED
OS_AL: 24.6853
OD_AL: 24.4276
OS_AL: 24.3695
OS_AL: 24.4216
OD_AL: 24.2205
OS_AL: 24.4216
OD_AL: 24.6915
OD_AL: 24.3236

## 2022-11-11 ASSESSMENT — REFRACTION_CURRENTRX
OD_AXIS: 010
OD_OVR_VA: 20/
OD_VPRISM_DIRECTION: SV
OD_SPHERE: -1.25
OS_CYLINDER: -3.75
OS_OVR_VA: 20/
OS_AXIS: 180
OD_VPRISM_DIRECTION: SV
OS_CYLINDER: -4.00
OS_OVR_VA: 20/
OD_CYLINDER: -4.00
OD_OVR_VA: 20/
OS_SPHERE: -1.00
OS_VPRISM_DIRECTION: SV
OS_AXIS: 005
OS_SPHERE: -1.25
OD_AXIS: 005
OD_SPHERE: -1.00
OS_VPRISM_DIRECTION: SV
OD_CYLINDER: -3.50

## 2022-11-11 ASSESSMENT — VISUAL ACUITY
OS_BCVA: 20/30
OD_BCVA: 20/30

## 2022-11-11 ASSESSMENT — KERATOMETRY
OS_K1POWER_DIOPTERS: 42.25
OS_AXISANGLE_DEGREES: 085
METHOD_AUTO_MANUAL: AUTO
OD_AXISANGLE_DEGREES: 090
OS_K2POWER_DIOPTERS: 46.75
OD_K1POWER_DIOPTERS: 42.25
OD_K2POWER_DIOPTERS: 47.25

## 2022-11-11 ASSESSMENT — TONOMETRY
OS_IOP_MMHG: 18
OD_IOP_MMHG: 18

## 2022-11-11 ASSESSMENT — CONFRONTATIONAL VISUAL FIELD TEST (CVF)
OS_FINDINGS: FULL
OD_FINDINGS: FULL

## 2022-11-11 ASSESSMENT — SUPERFICIAL PUNCTATE KERATITIS (SPK)
OD_SPK: 1+
OS_SPK: 1+

## 2023-01-20 NOTE — ED ADULT NURSE NOTE - PAIN: PRESENCE, MLM
denies pain/discomfort Non-Graft Cartilage Fenestration Text: The cartilage was fenestrated with a 2mm punch biopsy to help facilitate healing.

## 2023-02-14 NOTE — ED ADULT NURSE NOTE - NS ED NURSE LEVEL OF CONSCIOUSNESS MENTAL STATUS
Alert/Awake Opzelura Counseling:  I discussed with the patient the risks of Opzelura including but not limited to nasopharngitis, bronchitis, ear infection, eosinophila, hives, diarrhea, folliculitis, tonsillitis, and rhinorrhea.  Taken orally, this medication has been linked to serious infections; higher rate of mortality; malignancy and lymphoproliferative disorders; major adverse cardiovascular events; thrombosis; thrombocytopenia, anemia, and neutropenia; and lipid elevations.

## 2023-07-10 ENCOUNTER — OFFICE (OUTPATIENT)
Dept: URBAN - METROPOLITAN AREA CLINIC 116 | Facility: CLINIC | Age: 38
Setting detail: OPHTHALMOLOGY
End: 2023-07-10
Payer: COMMERCIAL

## 2023-07-10 DIAGNOSIS — E11.9: ICD-10-CM

## 2023-07-10 DIAGNOSIS — H16.221: ICD-10-CM

## 2023-07-10 DIAGNOSIS — H16.223: ICD-10-CM

## 2023-07-10 DIAGNOSIS — H43.393: ICD-10-CM

## 2023-07-10 DIAGNOSIS — H16.222: ICD-10-CM

## 2023-07-10 PROBLEM — H52.203 ASTIGMATISM, UNSPECIFIED; BOTH EYES: Status: ACTIVE | Noted: 2023-07-10

## 2023-07-10 PROCEDURE — 92014 COMPRE OPH EXAM EST PT 1/>: CPT | Performed by: OPTOMETRIST

## 2023-07-10 PROCEDURE — 92250 FUNDUS PHOTOGRAPHY W/I&R: CPT | Performed by: OPTOMETRIST

## 2023-07-10 ASSESSMENT — REFRACTION_CURRENTRX
OD_OVR_VA: 20/
OD_CYLINDER: -4.00
OD_OVR_VA: 20/
OD_VPRISM_DIRECTION: SV
OS_SPHERE: -1.25
OD_SPHERE: -1.25
OS_CYLINDER: -4.00
OS_VPRISM_DIRECTION: SV
OD_AXIS: 005
OD_VPRISM_DIRECTION: SV
OD_CYLINDER: -2.75
OS_AXIS: 180
OS_AXIS: 005
OD_OVR_VA: 20/
OS_CYLINDER: -3.75
OS_OVR_VA: 20/
OD_SPHERE: PLANO
OS_VPRISM_DIRECTION: SV
OS_CYLINDER: -2.50
OS_SPHERE: -1.00
OD_AXIS: 010
OD_CYLINDER: -3.50
OS_AXIS: 180
OD_SPHERE: -1.00
OS_OVR_VA: 20/
OS_SPHERE: PLANO
OS_OVR_VA: 20/
OD_AXIS: 180

## 2023-07-10 ASSESSMENT — SPHEQUIV_DERIVED
OS_SPHEQUIV: -3
OS_SPHEQUIV: -2.875
OS_SPHEQUIV: -3
OS_SPHEQUIV: -3.25
OD_SPHEQUIV: -2.5
OD_SPHEQUIV: -2.75
OD_SPHEQUIV: -3
OD_SPHEQUIV: -3.25
OS_SPHEQUIV: -2.5
OD_SPHEQUIV: -3.25

## 2023-07-10 ASSESSMENT — REFRACTION_MANIFEST
OD_CYLINDER: -3.50
OS_VA1: 20/25-
OD_SPHERE: -1.25
OS_CYLINDER: -4.00
OS_CYLINDER: -4.00
OS_CYLINDER: -3.75
OS_AXIS: 005
OD_CYLINDER: -3.00
OS_SPHERE: -1.00
OD_VA1: 20/20
OS_SPHERE: -1.00
OD_AXIS: 005
OD_CYLINDER: -4.00
OS_AXIS: 180
OD_VA1: 20/25-
OS_VA1: 20/25
OU_VA: 20/25-
OD_CYLINDER: -4.00
OD_SPHERE: -1.00
OS_SPHERE: -1.00
OD_AXIS: 180
OD_VA1: 20/25
OD_VA1: 20/25
OD_SPHERE: -1.00
OD_AXIS: 180
OS_VA1: 20/20
OS_AXIS: 180
OS_CYLINDER: -3.00
OD_SPHERE: -1.00
OD_AXIS: 5
OS_AXIS: 180
OS_VA1: 20/25
OS_SPHERE: -1.00

## 2023-07-10 ASSESSMENT — CONFRONTATIONAL VISUAL FIELD TEST (CVF)
OS_FINDINGS: FULL
OD_FINDINGS: FULL

## 2023-07-10 ASSESSMENT — KERATOMETRY
OS_K1POWER_DIOPTERS: UTP
OD_K1POWER_DIOPTERS: UTP
METHOD_AUTO_MANUAL: AUTO

## 2023-07-10 ASSESSMENT — SUPERFICIAL PUNCTATE KERATITIS (SPK)
OD_SPK: 1+
OS_SPK: 1+

## 2023-07-10 ASSESSMENT — REFRACTION_AUTOREFRACTION
OD_CYLINDER: -4.00
OD_AXIS: 005
OS_SPHERE: -1.25
OS_CYLINDER: -4.00
OD_SPHERE: -1.25
OS_AXIS: 180

## 2023-07-10 ASSESSMENT — TONOMETRY
OS_IOP_MMHG: 18
OD_IOP_MMHG: 18

## 2023-07-10 ASSESSMENT — VISUAL ACUITY
OD_BCVA: 20/30
OS_BCVA: 20/30
